# Patient Record
Sex: MALE | Race: WHITE | Employment: UNEMPLOYED | URBAN - METROPOLITAN AREA
[De-identification: names, ages, dates, MRNs, and addresses within clinical notes are randomized per-mention and may not be internally consistent; named-entity substitution may affect disease eponyms.]

---

## 2022-03-31 ENCOUNTER — TELEPHONE (OUTPATIENT)
Dept: ADMINISTRATIVE | Age: 62
End: 2022-03-31

## 2022-04-01 ENCOUNTER — TELEPHONE (OUTPATIENT)
Dept: RHEUMATOLOGY | Age: 62
End: 2022-04-01

## 2022-04-01 NOTE — TELEPHONE ENCOUNTER
Pt called in wanting to know if Dr. Katerin He offers knee injections? Please, advise. Josias Dobson can be reached at [phone]. Thank you.

## 2022-07-18 ENCOUNTER — OFFICE VISIT (OUTPATIENT)
Dept: RHEUMATOLOGY | Age: 62
End: 2022-07-18
Payer: COMMERCIAL

## 2022-07-18 VITALS
HEIGHT: 68 IN | WEIGHT: 176 LBS | DIASTOLIC BLOOD PRESSURE: 78 MMHG | SYSTOLIC BLOOD PRESSURE: 126 MMHG | BODY MASS INDEX: 26.67 KG/M2 | HEART RATE: 66 BPM | RESPIRATION RATE: 16 BRPM | OXYGEN SATURATION: 97 %

## 2022-07-18 DIAGNOSIS — M15.9 GENERALIZED OSTEOARTHRITIS: ICD-10-CM

## 2022-07-18 DIAGNOSIS — R76.8 POSITIVE ANA (ANTINUCLEAR ANTIBODY): Primary | ICD-10-CM

## 2022-07-18 DIAGNOSIS — R76.8 POSITIVE ANA (ANTINUCLEAR ANTIBODY): ICD-10-CM

## 2022-07-18 LAB
ALBUMIN SERPL-MCNC: 4.7 G/DL (ref 3.5–5.2)
ALP BLD-CCNC: 73 U/L (ref 40–129)
ALT SERPL-CCNC: 12 U/L (ref 0–40)
ANION GAP SERPL CALCULATED.3IONS-SCNC: 11 MMOL/L (ref 7–16)
AST SERPL-CCNC: 16 U/L (ref 0–39)
BASOPHILS ABSOLUTE: 0.02 E9/L (ref 0–0.2)
BASOPHILS RELATIVE PERCENT: 0.6 % (ref 0–2)
BILIRUB SERPL-MCNC: 1.3 MG/DL (ref 0–1.2)
BILIRUBIN URINE: NEGATIVE
BLOOD, URINE: NEGATIVE
BUN BLDV-MCNC: 18 MG/DL (ref 6–20)
C3 COMPLEMENT: 119 MG/DL (ref 90–180)
C4 COMPLEMENT: 30 MG/DL (ref 10–40)
CALCIUM SERPL-MCNC: 9.5 MG/DL (ref 8.6–10.2)
CHLORIDE BLD-SCNC: 102 MMOL/L (ref 98–107)
CLARITY: CLEAR
CO2: 28 MMOL/L (ref 22–29)
COLOR: YELLOW
CREAT SERPL-MCNC: 1.2 MG/DL (ref 0.7–1.2)
EOSINOPHILS ABSOLUTE: 0.05 E9/L (ref 0.05–0.5)
EOSINOPHILS RELATIVE PERCENT: 1.5 % (ref 0–6)
GFR AFRICAN AMERICAN: >60
GFR NON-AFRICAN AMERICAN: >60 ML/MIN/1.73
GLUCOSE BLD-MCNC: 86 MG/DL (ref 74–99)
GLUCOSE URINE: NEGATIVE MG/DL
HCT VFR BLD CALC: 43.2 % (ref 37–54)
HEMOGLOBIN: 13.5 G/DL (ref 12.5–16.5)
IMMATURE GRANULOCYTES #: 0.01 E9/L
IMMATURE GRANULOCYTES %: 0.3 % (ref 0–5)
KETONES, URINE: ABNORMAL MG/DL
LEUKOCYTE ESTERASE, URINE: NEGATIVE
LYMPHOCYTES ABSOLUTE: 1.02 E9/L (ref 1.5–4)
LYMPHOCYTES RELATIVE PERCENT: 30.1 % (ref 20–42)
MCH RBC QN AUTO: 29.9 PG (ref 26–35)
MCHC RBC AUTO-ENTMCNC: 31.3 % (ref 32–34.5)
MCV RBC AUTO: 95.6 FL (ref 80–99.9)
MONOCYTES ABSOLUTE: 0.36 E9/L (ref 0.1–0.95)
MONOCYTES RELATIVE PERCENT: 10.6 % (ref 2–12)
NEUTROPHILS ABSOLUTE: 1.93 E9/L (ref 1.8–7.3)
NEUTROPHILS RELATIVE PERCENT: 56.9 % (ref 43–80)
NITRITE, URINE: NEGATIVE
PDW BLD-RTO: 14.3 FL (ref 11.5–15)
PH UA: 6.5 (ref 5–9)
PLATELET # BLD: 317 E9/L (ref 130–450)
PMV BLD AUTO: 10.9 FL (ref 7–12)
POTASSIUM SERPL-SCNC: 4.3 MMOL/L (ref 3.5–5)
PROTEIN UA: NEGATIVE MG/DL
RBC # BLD: 4.52 E12/L (ref 3.8–5.8)
SODIUM BLD-SCNC: 141 MMOL/L (ref 132–146)
SPECIFIC GRAVITY UA: 1.02 (ref 1–1.03)
TOTAL PROTEIN: 7.2 G/DL (ref 6.4–8.3)
UROBILINOGEN, URINE: 0.2 E.U./DL
WBC # BLD: 3.4 E9/L (ref 4.5–11.5)

## 2022-07-18 PROCEDURE — 99204 OFFICE O/P NEW MOD 45 MIN: CPT | Performed by: INTERNAL MEDICINE

## 2022-07-18 RX ORDER — TRAMADOL HYDROCHLORIDE 50 MG/1
50 TABLET ORAL EVERY 6 HOURS PRN
COMMUNITY

## 2022-07-18 ASSESSMENT — ENCOUNTER SYMPTOMS
TROUBLE SWALLOWING: 0
ABDOMINAL PAIN: 0
VOMITING: 0
DIARRHEA: 0
COUGH: 0
NAUSEA: 0
SHORTNESS OF BREATH: 0
COLOR CHANGE: 0

## 2022-07-18 NOTE — PROGRESS NOTES
Raynaud's, dysuria, hematuria, history of DVT. Past Medical History:   Diagnosis Date    Fracture of right wrist 1973    GERD (gastroesophageal reflux disease)     Heel spur     Plantar fasciitis     Sinus problem     cysts and polyps        Review of Systems   Constitutional:  Negative for fatigue and fever. HENT:  Negative for mouth sores and trouble swallowing. Respiratory:  Negative for cough and shortness of breath. Cardiovascular:  Negative for chest pain. Gastrointestinal:  Negative for abdominal pain, diarrhea, nausea and vomiting. Genitourinary:  Negative for dysuria and hematuria. Musculoskeletal:  Positive for arthralgias. Negative for joint swelling. Skin:  Negative for color change and rash. Neurological:  Negative for weakness and numbness. Hematological:  Negative for adenopathy. All other systems reviewed and are negative. Objective   Vitals:    07/18/22 1428   BP: 126/78   Pulse: 66   Resp: 16   SpO2: 97%      Physical Exam  Constitutional:       General: He is not in acute distress. Appearance: Normal appearance. HENT:      Head: Normocephalic and atraumatic. Right Ear: External ear normal.      Left Ear: External ear normal.      Nose: Nose normal.   Eyes:      General: No scleral icterus. Neck:      Comments: He has 2 lipomas versus inclusion cyst of the right side of the neck. They are too superficial to be lymph nodes. Pulmonary:      Effort: Pulmonary effort is normal.   Musculoskeletal:         General: No swelling, tenderness or deformity. Skin:     General: Skin is warm and dry. Findings: No rash. Neurological:      General: No focal deficit present. Mental Status: He is alert and oriented to person, place, and time. Mental status is at baseline.    Psychiatric:         Mood and Affect: Mood normal.         Behavior: Behavior normal.          No results found for: WBC, HGB, HCT, MCV, PLT  No results found for: NA, K, CL, CO2, BUN, CREATININE, GLUCOSE, CALCIUM, PROT, LABALBU, BILITOT, ALKPHOS, AST, ALT, LABGLOM, GFRAA, AGRATIO, GLOB  No results found for: SUSIE  No results found for: RHEUMFACTOR  No results found for: SEDRATE  No results found for: CRP         An electronic signature was used to authenticate this note. This note was generated with a voice recognition dictation system. Please disregard any errors or omission which have escaped my review.     --Santana Morse, DO

## 2022-07-19 LAB
ANTI DNA DOUBLE STRANDED: NEGATIVE
ENA TO RNP ANTIBODY: NEGATIVE
ENA TO SMITH (SM) ANTIBODY: NEGATIVE
ENA TO SSA (RO) ANTIBODY: NEGATIVE
ENA TO SSB (LA) ANTIBODY: NEGATIVE

## 2024-04-27 ENCOUNTER — HOSPITAL ENCOUNTER (EMERGENCY)
Age: 64
Discharge: HOME | End: 2024-04-27
Attending: STUDENT IN AN ORGANIZED HEALTH CARE EDUCATION/TRAINING PROGRAM
Payer: COMMERCIAL

## 2024-04-27 ENCOUNTER — APPOINTMENT (OUTPATIENT)
Dept: CT IMAGING | Age: 64
End: 2024-04-27
Payer: COMMERCIAL

## 2024-04-27 VITALS
RESPIRATION RATE: 18 BRPM | TEMPERATURE: 98.1 F | SYSTOLIC BLOOD PRESSURE: 146 MMHG | DIASTOLIC BLOOD PRESSURE: 81 MMHG | HEART RATE: 87 BPM | OXYGEN SATURATION: 98 % | BODY MASS INDEX: 25.46 KG/M2 | HEIGHT: 68 IN | WEIGHT: 168 LBS

## 2024-04-27 DIAGNOSIS — K76.9 HEPATIC LESION: ICD-10-CM

## 2024-04-27 DIAGNOSIS — R10.9 ABDOMINAL PAIN, UNSPECIFIED ABDOMINAL LOCATION: Primary | ICD-10-CM

## 2024-04-27 DIAGNOSIS — E87.6 HYPOKALEMIA: ICD-10-CM

## 2024-04-27 DIAGNOSIS — N28.9 KIDNEY LESION: ICD-10-CM

## 2024-04-27 LAB
ALBUMIN SERPL-MCNC: 4.8 G/DL (ref 3.5–5.2)
ALP SERPL-CCNC: 61 U/L (ref 40–129)
ALT SERPL-CCNC: 32 U/L (ref 0–40)
ANION GAP SERPL CALCULATED.3IONS-SCNC: 13 MMOL/L (ref 7–16)
AST SERPL-CCNC: 58 U/L (ref 0–39)
BASOPHILS # BLD: 0.02 K/UL (ref 0–0.2)
BASOPHILS NFR BLD: 1 % (ref 0–2)
BILIRUB SERPL-MCNC: 1.1 MG/DL (ref 0–1.2)
BILIRUB UR QL STRIP: NEGATIVE
BUN SERPL-MCNC: 19 MG/DL (ref 6–23)
CALCIUM SERPL-MCNC: 9.1 MG/DL (ref 8.6–10.2)
CHLORIDE SERPL-SCNC: 101 MMOL/L (ref 98–107)
CLARITY UR: CLEAR
CO2 SERPL-SCNC: 27 MMOL/L (ref 22–29)
COLOR UR: YELLOW
CREAT SERPL-MCNC: 1.6 MG/DL (ref 0.7–1.2)
EOSINOPHIL # BLD: 0.07 K/UL (ref 0.05–0.5)
EOSINOPHILS RELATIVE PERCENT: 2 % (ref 0–6)
ERYTHROCYTE [DISTWIDTH] IN BLOOD BY AUTOMATED COUNT: 13.4 % (ref 11.5–15)
GFR SERPL CREATININE-BSD FRML MDRD: 50 ML/MIN/1.73M2
GLUCOSE SERPL-MCNC: 133 MG/DL (ref 74–99)
GLUCOSE UR STRIP-MCNC: NEGATIVE MG/DL
HCT VFR BLD AUTO: 44.3 % (ref 37–54)
HGB BLD-MCNC: 14.4 G/DL (ref 12.5–16.5)
HGB UR QL STRIP.AUTO: NEGATIVE
IMM GRANULOCYTES # BLD AUTO: <0.03 K/UL (ref 0–0.58)
IMM GRANULOCYTES NFR BLD: 0 % (ref 0–5)
KETONES UR STRIP-MCNC: 15 MG/DL
LACTATE BLDV-SCNC: 1.6 MMOL/L (ref 0.5–2.2)
LEUKOCYTE ESTERASE UR QL STRIP: NEGATIVE
LIPASE SERPL-CCNC: 19 U/L (ref 13–60)
LYMPHOCYTES NFR BLD: 1.33 K/UL (ref 1.5–4)
LYMPHOCYTES RELATIVE PERCENT: 31 % (ref 20–42)
MAGNESIUM SERPL-MCNC: 2 MG/DL (ref 1.6–2.6)
MCH RBC QN AUTO: 30 PG (ref 26–35)
MCHC RBC AUTO-ENTMCNC: 32.5 G/DL (ref 32–34.5)
MCV RBC AUTO: 92.3 FL (ref 80–99.9)
MONOCYTES NFR BLD: 0.35 K/UL (ref 0.1–0.95)
MONOCYTES NFR BLD: 8 % (ref 2–12)
NEUTROPHILS NFR BLD: 59 % (ref 43–80)
NEUTS SEG NFR BLD: 2.52 K/UL (ref 1.8–7.3)
NITRITE UR QL STRIP: NEGATIVE
PH UR STRIP: 6 [PH] (ref 5–9)
PLATELET # BLD AUTO: 349 K/UL (ref 130–450)
PMV BLD AUTO: 10.3 FL (ref 7–12)
POTASSIUM SERPL-SCNC: 3.4 MMOL/L (ref 3.5–5)
PROT SERPL-MCNC: 7.4 G/DL (ref 6.4–8.3)
PROT UR STRIP-MCNC: NEGATIVE MG/DL
RBC # BLD AUTO: 4.8 M/UL (ref 3.8–5.8)
RBC #/AREA URNS HPF: ABNORMAL /HPF
SODIUM SERPL-SCNC: 141 MMOL/L (ref 132–146)
SP GR UR STRIP: 1.01 (ref 1–1.03)
UROBILINOGEN UR STRIP-ACNC: 1 EU/DL (ref 0–1)
WBC #/AREA URNS HPF: ABNORMAL /HPF
WBC OTHER # BLD: 4.3 K/UL (ref 4.5–11.5)

## 2024-04-27 PROCEDURE — 6360000004 HC RX CONTRAST MEDICATION: Performed by: RADIOLOGY

## 2024-04-27 PROCEDURE — 6370000000 HC RX 637 (ALT 250 FOR IP): Performed by: STUDENT IN AN ORGANIZED HEALTH CARE EDUCATION/TRAINING PROGRAM

## 2024-04-27 PROCEDURE — 81001 URINALYSIS AUTO W/SCOPE: CPT

## 2024-04-27 PROCEDURE — 96372 THER/PROPH/DIAG INJ SC/IM: CPT

## 2024-04-27 PROCEDURE — 6360000002 HC RX W HCPCS: Performed by: STUDENT IN AN ORGANIZED HEALTH CARE EDUCATION/TRAINING PROGRAM

## 2024-04-27 PROCEDURE — 96374 THER/PROPH/DIAG INJ IV PUSH: CPT

## 2024-04-27 PROCEDURE — 99285 EMERGENCY DEPT VISIT HI MDM: CPT

## 2024-04-27 PROCEDURE — 80053 COMPREHEN METABOLIC PANEL: CPT

## 2024-04-27 PROCEDURE — 2580000003 HC RX 258: Performed by: STUDENT IN AN ORGANIZED HEALTH CARE EDUCATION/TRAINING PROGRAM

## 2024-04-27 PROCEDURE — 85025 COMPLETE CBC W/AUTO DIFF WBC: CPT

## 2024-04-27 PROCEDURE — 83690 ASSAY OF LIPASE: CPT

## 2024-04-27 PROCEDURE — 83735 ASSAY OF MAGNESIUM: CPT

## 2024-04-27 PROCEDURE — 83605 ASSAY OF LACTIC ACID: CPT

## 2024-04-27 PROCEDURE — 74177 CT ABD & PELVIS W/CONTRAST: CPT

## 2024-04-27 RX ORDER — POTASSIUM CHLORIDE 20 MEQ/1
40 TABLET, EXTENDED RELEASE ORAL ONCE
Status: COMPLETED | OUTPATIENT
Start: 2024-04-27 | End: 2024-04-27

## 2024-04-27 RX ORDER — MORPHINE SULFATE 4 MG/ML
4 INJECTION, SOLUTION INTRAMUSCULAR; INTRAVENOUS ONCE
Status: COMPLETED | OUTPATIENT
Start: 2024-04-27 | End: 2024-04-27

## 2024-04-27 RX ORDER — DOCUSATE SODIUM 100 MG/1
100 CAPSULE, LIQUID FILLED ORAL 2 TIMES DAILY
Qty: 60 CAPSULE | Refills: 0 | Status: SHIPPED | OUTPATIENT
Start: 2024-04-27 | End: 2024-05-27

## 2024-04-27 RX ORDER — 0.9 % SODIUM CHLORIDE 0.9 %
1000 INTRAVENOUS SOLUTION INTRAVENOUS ONCE
Status: COMPLETED | OUTPATIENT
Start: 2024-04-27 | End: 2024-04-27

## 2024-04-27 RX ORDER — DICYCLOMINE HYDROCHLORIDE 10 MG/ML
20 INJECTION INTRAMUSCULAR ONCE
Status: COMPLETED | OUTPATIENT
Start: 2024-04-27 | End: 2024-04-27

## 2024-04-27 RX ADMIN — IOPAMIDOL 75 ML: 755 INJECTION, SOLUTION INTRAVENOUS at 02:36

## 2024-04-27 RX ADMIN — MORPHINE SULFATE 4 MG: 4 INJECTION, SOLUTION INTRAMUSCULAR; INTRAVENOUS at 01:16

## 2024-04-27 RX ADMIN — SODIUM CHLORIDE 1000 ML: 9 INJECTION, SOLUTION INTRAVENOUS at 01:17

## 2024-04-27 RX ADMIN — DICYCLOMINE HYDROCHLORIDE 20 MG: 10 INJECTION, SOLUTION INTRAMUSCULAR at 01:16

## 2024-04-27 RX ADMIN — POTASSIUM CHLORIDE 40 MEQ: 1500 TABLET, EXTENDED RELEASE ORAL at 03:59

## 2024-04-27 ASSESSMENT — ENCOUNTER SYMPTOMS
NAUSEA: 0
COUGH: 0
BACK PAIN: 0
ABDOMINAL PAIN: 1
PHOTOPHOBIA: 0
SHORTNESS OF BREATH: 0
SORE THROAT: 0
DIARRHEA: 0

## 2024-04-27 ASSESSMENT — PAIN - FUNCTIONAL ASSESSMENT: PAIN_FUNCTIONAL_ASSESSMENT: 0-10

## 2024-04-27 ASSESSMENT — PAIN DESCRIPTION - LOCATION: LOCATION: ABDOMEN

## 2024-04-27 ASSESSMENT — PAIN SCALES - GENERAL: PAINLEVEL_OUTOF10: 10

## 2024-04-27 ASSESSMENT — PAIN DESCRIPTION - DESCRIPTORS: DESCRIPTORS: CRAMPING

## 2024-04-27 NOTE — ED PROVIDER NOTES
[facility] EMERGENCY DEPARTMENT  EMERGENCY DEPARTMENT ENCOUNTER        Pt Name: Jose Ocasio  MRN: [id number]  Birthdate 1960  Date of evaluation: 4/27/2024  Provider: Kiley Alvarez MD  PCP: Maximiliano Lainez MD  Note Started: 2:52 AM EDT 4/27/24    HPI  63 y.o. male presenting for abdominal pain.  Patient complains of diffuse abdominal cramping since 11 PM tonight.  He denies any nausea, emesis.  He states he is never had this before.  He denies any previous abdominal surgical history.  He states his last bowel movement was earlier in the day.  Denies any fevers, chest pain, shortness of breath.      --------------------------------------------- PAST HISTORY ---------------------------------------------  Past Medical History:  has a past medical history of Fracture of right wrist, GERD (gastroesophageal reflux disease), Heel spur, Plantar fasciitis, and Sinus problem.    Past Surgical History:  has a past surgical history that includes other surgical history (1988); sinus surgery; Endoscopy, colon, diagnostic; Colonoscopy; and nasal/sinus endoscopy (12/08/2014).    Social History:  reports that he has never smoked. He has never used smokeless tobacco. He reports that he does not drink alcohol and does not use drugs.    Family History: family history is not on file.     The patient’s home medications have been reviewed.    Allergies: Milk-related compounds, Molds & smuts, Cat hair extract, and Dust mite extract      Review of Systems   Constitutional:  Negative for chills and fever.   HENT:  Negative for congestion and sore throat.    Eyes:  Negative for photophobia and visual disturbance.   Respiratory:  Negative for cough and shortness of breath.    Cardiovascular:  Negative for chest pain and leg swelling.   Gastrointestinal:  Positive for abdominal pain. Negative for diarrhea and nausea.   Genitourinary:  Negative for dysuria and flank pain.

## 2024-05-03 ENCOUNTER — HOSPITAL ENCOUNTER (EMERGENCY)
Age: 64
Discharge: HOME | End: 2024-05-03
Attending: STUDENT IN AN ORGANIZED HEALTH CARE EDUCATION/TRAINING PROGRAM
Payer: COMMERCIAL

## 2024-05-03 VITALS
HEART RATE: 86 BPM | TEMPERATURE: 98.4 F | WEIGHT: 168 LBS | DIASTOLIC BLOOD PRESSURE: 95 MMHG | OXYGEN SATURATION: 100 % | RESPIRATION RATE: 16 BRPM | HEIGHT: 68 IN | SYSTOLIC BLOOD PRESSURE: 141 MMHG | BODY MASS INDEX: 25.46 KG/M2

## 2024-05-03 DIAGNOSIS — H53.9 VISUAL DISTURBANCE OF ONE EYE: Primary | ICD-10-CM

## 2024-05-03 PROCEDURE — 99282 EMERGENCY DEPT VISIT SF MDM: CPT

## 2024-05-03 ASSESSMENT — VISUAL ACUITY
OS: 20/13
OD: 20/15
OU: 20/13

## 2024-05-03 ASSESSMENT — PAIN SCALES - GENERAL: PAINLEVEL_OUTOF10: 2

## 2024-05-03 ASSESSMENT — PAIN - FUNCTIONAL ASSESSMENT: PAIN_FUNCTIONAL_ASSESSMENT: 0-10

## 2024-05-03 NOTE — ED PROVIDER NOTES
[facility] EMERGENCY DEPARTMENT  EMERGENCY DEPARTMENT ENCOUNTER        Pt Name: Jose Ocasio  MRN: [id number]  Birthdate 1960  Date of evaluation: 5/3/2024  Provider: Kiley Alvarez MD  PCP: Maximiliano Lainez MD  Note Started: 5:27 AM EDT 5/3/24    HPI  63 y.o. male presenting for eye pain.  Patient complains of seeing flashes in the side of his right eye and starts, and floaters since yesterday.  He complains of eye irritation.  He complains of visual disturbance when the floaters which is out of his eye.  Symptoms are worsened with eye movement.  He denies any headache, numbness, tingling, or other complaints at this time.      --------------------------------------------- PAST HISTORY ---------------------------------------------  Past Medical History:  has a past medical history of Fracture of right wrist, GERD (gastroesophageal reflux disease), Heel spur, Plantar fasciitis, and Sinus problem.    Past Surgical History:  has a past surgical history that includes other surgical history (1988); sinus surgery; Endoscopy, colon, diagnostic; Colonoscopy; and nasal/sinus endoscopy (12/08/2014).    Social History:  reports that he has never smoked. He has never used smokeless tobacco. He reports that he does not drink alcohol and does not use drugs.    Family History: family history is not on file.     The patient’s home medications have been reviewed.    Allergies: Milk-related compounds, Molds & smuts, Cat hair extract, and Dust mite extract      Review of Systems   Eyes:  Positive for visual disturbance.   All other systems reviewed and are negative.       Physical Exam  Constitutional:       General: He is not in acute distress.     Appearance: Normal appearance. He is not ill-appearing.   HENT:      Head: Normocephalic and atraumatic.      Right Ear: External ear normal.      Left Ear: External ear normal.      Nose: Nose normal.   Eyes:      Extraocular

## 2024-09-26 ENCOUNTER — HOSPITAL ENCOUNTER (INPATIENT)
Age: 64
LOS: 17 days | Discharge: PSYCHIATRIC OR SUBSTANCE USE FACILITY | DRG: 885 | End: 2024-10-14
Attending: EMERGENCY MEDICINE | Admitting: PSYCHIATRY & NEUROLOGY
Payer: COMMERCIAL

## 2024-09-26 DIAGNOSIS — F29 PSYCHOSIS, UNSPECIFIED PSYCHOSIS TYPE (HCC): ICD-10-CM

## 2024-09-26 DIAGNOSIS — Z00.8 PATIENT NEEDS PSYCHIATRIC HOLD FOR EVALUATION: Primary | ICD-10-CM

## 2024-09-26 LAB
ALBUMIN SERPL-MCNC: 4.3 G/DL (ref 3.5–5.2)
ALP SERPL-CCNC: 74 U/L (ref 40–129)
ALT SERPL-CCNC: 9 U/L (ref 0–40)
AMPHET UR QL SCN: NEGATIVE
ANION GAP SERPL CALCULATED.3IONS-SCNC: 11 MMOL/L (ref 7–16)
APAP SERPL-MCNC: <5 UG/ML (ref 10–30)
AST SERPL-CCNC: 22 U/L (ref 0–39)
BARBITURATES UR QL SCN: NEGATIVE
BASOPHILS # BLD: 0.03 K/UL (ref 0–0.2)
BASOPHILS NFR BLD: 1 % (ref 0–2)
BENZODIAZ UR QL: NEGATIVE
BILIRUB SERPL-MCNC: 1 MG/DL (ref 0–1.2)
BUN SERPL-MCNC: 7 MG/DL (ref 6–23)
BUPRENORPHINE UR QL: NEGATIVE
CALCIUM SERPL-MCNC: 9.4 MG/DL (ref 8.6–10.2)
CANNABINOIDS UR QL SCN: NEGATIVE
CHLORIDE SERPL-SCNC: 104 MMOL/L (ref 98–107)
CO2 SERPL-SCNC: 28 MMOL/L (ref 22–29)
COCAINE UR QL SCN: NEGATIVE
CREAT SERPL-MCNC: 1.1 MG/DL (ref 0.7–1.2)
EOSINOPHIL # BLD: 0.11 K/UL (ref 0.05–0.5)
EOSINOPHILS RELATIVE PERCENT: 2 % (ref 0–6)
ERYTHROCYTE [DISTWIDTH] IN BLOOD BY AUTOMATED COUNT: 14.8 % (ref 11.5–15)
ETHANOLAMINE SERPL-MCNC: <10 MG/DL (ref 0–0.08)
FENTANYL UR QL: NEGATIVE
GFR, ESTIMATED: 76 ML/MIN/1.73M2
GLUCOSE SERPL-MCNC: 79 MG/DL (ref 74–99)
HCT VFR BLD AUTO: 43.1 % (ref 37–54)
HGB BLD-MCNC: 13.9 G/DL (ref 12.5–16.5)
IMM GRANULOCYTES # BLD AUTO: <0.03 K/UL (ref 0–0.58)
IMM GRANULOCYTES NFR BLD: 0 % (ref 0–5)
LYMPHOCYTES NFR BLD: 0.9 K/UL (ref 1.5–4)
LYMPHOCYTES RELATIVE PERCENT: 19 % (ref 20–42)
MCH RBC QN AUTO: 29.6 PG (ref 26–35)
MCHC RBC AUTO-ENTMCNC: 32.3 G/DL (ref 32–34.5)
MCV RBC AUTO: 91.7 FL (ref 80–99.9)
METHADONE UR QL: NEGATIVE
MONOCYTES NFR BLD: 0.54 K/UL (ref 0.1–0.95)
MONOCYTES NFR BLD: 11 % (ref 2–12)
NEUTROPHILS NFR BLD: 67 % (ref 43–80)
NEUTS SEG NFR BLD: 3.19 K/UL (ref 1.8–7.3)
OPIATES UR QL SCN: NEGATIVE
OXYCODONE UR QL SCN: NEGATIVE
PCP UR QL SCN: NEGATIVE
PLATELET # BLD AUTO: 348 K/UL (ref 130–450)
PMV BLD AUTO: 9.6 FL (ref 7–12)
POTASSIUM SERPL-SCNC: 3.3 MMOL/L (ref 3.5–5)
PROT SERPL-MCNC: 7 G/DL (ref 6.4–8.3)
RBC # BLD AUTO: 4.7 M/UL (ref 3.8–5.8)
SALICYLATES SERPL-MCNC: <0.3 MG/DL (ref 0–30)
SODIUM SERPL-SCNC: 143 MMOL/L (ref 132–146)
TEST INFORMATION: NORMAL
TOXIC TRICYCLIC SC,BLOOD: NEGATIVE
WBC OTHER # BLD: 4.8 K/UL (ref 4.5–11.5)

## 2024-09-26 PROCEDURE — G0480 DRUG TEST DEF 1-7 CLASSES: HCPCS

## 2024-09-26 PROCEDURE — 80179 DRUG ASSAY SALICYLATE: CPT

## 2024-09-26 PROCEDURE — 80053 COMPREHEN METABOLIC PANEL: CPT

## 2024-09-26 PROCEDURE — 80307 DRUG TEST PRSMV CHEM ANLYZR: CPT

## 2024-09-26 PROCEDURE — 93005 ELECTROCARDIOGRAM TRACING: CPT | Performed by: EMERGENCY MEDICINE

## 2024-09-26 PROCEDURE — 80143 DRUG ASSAY ACETAMINOPHEN: CPT

## 2024-09-26 PROCEDURE — 99285 EMERGENCY DEPT VISIT HI MDM: CPT

## 2024-09-26 PROCEDURE — 85025 COMPLETE CBC W/AUTO DIFF WBC: CPT

## 2024-09-26 ASSESSMENT — LIFESTYLE VARIABLES: HOW OFTEN DO YOU HAVE A DRINK CONTAINING ALCOHOL: NEVER

## 2024-09-27 PROBLEM — F23 ACUTE PSYCHOSIS (HCC): Status: ACTIVE | Noted: 2024-09-27

## 2024-09-27 LAB
EKG ATRIAL RATE: 89 BPM
EKG P AXIS: 66 DEGREES
EKG P-R INTERVAL: 158 MS
EKG Q-T INTERVAL: 354 MS
EKG QRS DURATION: 90 MS
EKG QTC CALCULATION (BAZETT): 430 MS
EKG R AXIS: 67 DEGREES
EKG T AXIS: 68 DEGREES
EKG VENTRICULAR RATE: 89 BPM

## 2024-09-27 PROCEDURE — 93010 ELECTROCARDIOGRAM REPORT: CPT | Performed by: INTERNAL MEDICINE

## 2024-09-27 PROCEDURE — 90792 PSYCH DIAG EVAL W/MED SRVCS: CPT

## 2024-09-27 PROCEDURE — 1240000000 HC EMOTIONAL WELLNESS R&B

## 2024-09-27 RX ORDER — HALOPERIDOL 5 MG/ML
3 INJECTION INTRAMUSCULAR EVERY 6 HOURS PRN
Status: DISCONTINUED | OUTPATIENT
Start: 2024-09-27 | End: 2024-10-14 | Stop reason: HOSPADM

## 2024-09-27 RX ORDER — ACETAMINOPHEN 325 MG/1
650 TABLET ORAL EVERY 4 HOURS PRN
Status: DISCONTINUED | OUTPATIENT
Start: 2024-09-27 | End: 2024-10-14 | Stop reason: HOSPADM

## 2024-09-27 RX ORDER — LANOLIN ALCOHOL/MO/W.PET/CERES
3 CREAM (GRAM) TOPICAL NIGHTLY PRN
Status: DISCONTINUED | OUTPATIENT
Start: 2024-09-27 | End: 2024-10-14 | Stop reason: HOSPADM

## 2024-09-27 RX ORDER — ARMODAFINIL 150 MG/1
150 TABLET ORAL DAILY
Status: ON HOLD | COMMUNITY
End: 2024-10-14 | Stop reason: HOSPADM

## 2024-09-27 RX ORDER — RISPERIDONE 1 MG/1
1 TABLET ORAL NIGHTLY
Status: DISCONTINUED | OUTPATIENT
Start: 2024-09-27 | End: 2024-10-09

## 2024-09-27 RX ORDER — MAGNESIUM HYDROXIDE/ALUMINUM HYDROXICE/SIMETHICONE 120; 1200; 1200 MG/30ML; MG/30ML; MG/30ML
30 SUSPENSION ORAL PRN
Status: DISCONTINUED | OUTPATIENT
Start: 2024-09-27 | End: 2024-10-14 | Stop reason: HOSPADM

## 2024-09-27 RX ORDER — HALOPERIDOL 2 MG/1
3 TABLET ORAL EVERY 6 HOURS PRN
Status: DISCONTINUED | OUTPATIENT
Start: 2024-09-27 | End: 2024-10-14 | Stop reason: HOSPADM

## 2024-09-27 RX ORDER — HYDROXYZINE PAMOATE 25 MG/1
25 CAPSULE ORAL 3 TIMES DAILY PRN
Status: DISCONTINUED | OUTPATIENT
Start: 2024-09-27 | End: 2024-10-14 | Stop reason: HOSPADM

## 2024-09-27 RX ORDER — DIVALPROEX SODIUM 250 MG/1
250 TABLET, DELAYED RELEASE ORAL EVERY 8 HOURS SCHEDULED
Status: DISCONTINUED | OUTPATIENT
Start: 2024-09-27 | End: 2024-10-09

## 2024-09-27 RX ORDER — NICOTINE 21 MG/24HR
1 PATCH, TRANSDERMAL 24 HOURS TRANSDERMAL DAILY
Status: DISCONTINUED | OUTPATIENT
Start: 2024-09-27 | End: 2024-09-27

## 2024-09-27 ASSESSMENT — PATIENT HEALTH QUESTIONNAIRE - PHQ9
2. FEELING DOWN, DEPRESSED OR HOPELESS: SEVERAL DAYS
SUM OF ALL RESPONSES TO PHQ QUESTIONS 1-9: 1
SUM OF ALL RESPONSES TO PHQ9 QUESTIONS 1 & 2: 1
SUM OF ALL RESPONSES TO PHQ QUESTIONS 1-9: 1
1. LITTLE INTEREST OR PLEASURE IN DOING THINGS: NOT AT ALL
1. LITTLE INTEREST OR PLEASURE IN DOING THINGS: NOT AT ALL
SUM OF ALL RESPONSES TO PHQ QUESTIONS 1-9: 1
SUM OF ALL RESPONSES TO PHQ9 QUESTIONS 1 & 2: 1
2. FEELING DOWN, DEPRESSED OR HOPELESS: SEVERAL DAYS

## 2024-09-27 ASSESSMENT — LIFESTYLE VARIABLES
HOW MANY STANDARD DRINKS CONTAINING ALCOHOL DO YOU HAVE ON A TYPICAL DAY: PATIENT DOES NOT DRINK
HOW OFTEN DO YOU HAVE A DRINK CONTAINING ALCOHOL: NEVER
HOW MANY STANDARD DRINKS CONTAINING ALCOHOL DO YOU HAVE ON A TYPICAL DAY: PATIENT DOES NOT DRINK
HOW OFTEN DO YOU HAVE A DRINK CONTAINING ALCOHOL: NEVER

## 2024-09-27 ASSESSMENT — SLEEP AND FATIGUE QUESTIONNAIRES
AVERAGE NUMBER OF SLEEP HOURS: 6
DO YOU HAVE DIFFICULTY SLEEPING: NO
AVERAGE NUMBER OF SLEEP HOURS: 6
DO YOU USE A SLEEP AID: NO
DO YOU USE A SLEEP AID: NO
DO YOU HAVE DIFFICULTY SLEEPING: NO

## 2024-09-27 ASSESSMENT — PAIN SCALES - GENERAL: PAINLEVEL_OUTOF10: 0

## 2024-09-27 NOTE — CARE COORDINATION
CTRS met with patient to complete leisure assessment.       09/27/24 1200   Activities of Daily Living   Patient Requires assistance with daily self-care activities? No   Leisure Activity 1   3 Favorite Leisure Activities \"tennis, music, eating good food''   Frequency weekly   Last time this week   Barriers to participating  motivation;social (ie. no one to do it with);financial/money   Social   Patient reports spending the majority of their free time with one other person   Patient verbalizes a preference for spending free time with one other person   Patient’s perception of support system none  (patient does not believe he has support\)   Patient’s perception of barriers to socializing with others include(s) no perceived barriers   Social Details patient currently lives alone in an apartment.  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  Patient reports he has no support at this time   Beliefs & Coping   Has difficulty dealing with feelings   Yes   Internalizes feelings/Keeps feelings in No   Externalizes feelings through aggressiveness or poor temper control  No   Feels uncomfortable around others  No   Has difficulty talking to others  No   Depends on others for direction or decisions No   Difficulty dealing with anger of others  No   Difficulty dealing with own anger  No   Difficulty managing stress Yes  (\"the woman, my neighbor, is hacking me\")   Frequently has difficulty with relationships  Yes   which,who,where with friends/peers  (\"the woman who lives downstairs keeps hacking me\")   Has recently perceived/experienced loss, disappointment, humiliation or failure  NO   General perception about self likes self   Attitude about abilities more successful than not   Locus of Control  most of the time   Belief about recovery Recovery is possible   Patient Identified Strengths  \"Im articulate\"   Patient Identified Limitations  \"Nothing\"   Perception of most stressful event prior to hospitalization \"The police.  I think

## 2024-09-27 NOTE — BH NOTE
Patient transferred to unit at this time and tolerated well.  Patient with pressured speech, tangentiality, unstable and exaggerated affect, patient with noted paranoia and grandiosity.  With much 1:1 patient signed his admission paperwork, was able to review patient's medication list and verified medications at [facility] pharmacy in [place].  Patient requires CPAP due to KEENAN with last assessment being provided by Dr. Wild of [facility] on 9/13/24, settings per physicians notes as follows   Settings: 10.5-79oyA9P [sic]  90/95%ile pressure: 12.6cmH2O   Iman NP made aware of patient's CPAP need, patient's request for cepacol, and patient's medication rec completion including that patient was prescribed Armodafinil 150mg 7/8/24.  Patient was oriented to unit and verbalized understanding.  Electronic watch was removed from patient's possession.  Denies SI/HI and A/V/H upon arrival to unit.

## 2024-09-27 NOTE — CARE COORDINATION
SW attempted to meet with pt to complete assessment. Pt is currently on the phone. SW will meet with pt at a later time to complete assessment.

## 2024-09-27 NOTE — BH NOTE
4 Eyes Skin Assessment     NAME:  Jose Ocasio  YOB: 1960  MEDICAL RECORD NUMBER:  [id number]    The patient is being assessed for  Admission    I agree that at least one RN has performed a thorough Head to Toe Skin Assessment on the patient. ALL assessment sites listed below have been assessed.      Areas assessed by both nurses:    Head, Face, Ears, Shoulders, Back, Chest, Arms, Elbows, Hands, and Legs. Feet and Heels        Does the Patient have a Wound? No noted wound(s)       Bebeto Prevention initiated by RN: Yes  Wound Care Orders initiated by RN: No    Pressure Injury (Stage 3,4, Unstageable, DTI, NWPT, and Complex wounds) if present, place Wound referral order by RN under : No    New Ostomies, if present place, Ostomy referral order under : No     Nurse 1 eSignature: Electronically signed by Deborah Carty RN on 9/27/24 at 4:36 AM EDT    **SHARE this note so that the co-signing nurse can place an eSignature**    Nurse 2 eSignature: Electronically signed by Sena Adams RN on 9/27/24 at 6:45 AM EDT

## 2024-09-27 NOTE — ED NOTES
09/26/24  Jose Ocasio  [id number]  1960    Social Work Behavioral Health Crisis Assessment    Chief Complaint: The patient is a 63-year-old -American male who was sent in by police via pink slip after the patient went to a gun store and attempted to purchase a weapon to \"scare the neighbors into stopping the use of sound guns \".    Mental Status Exam:  Level of consciousness:  within normal limits   Appearance:  hospital attire, lying in bed, fair grooming, and fair hygiene.  Does not appear stated age. No acute distress.  Behavior/Motor:  no abnormalities noted  Attitude toward examiner:  cooperative  SI/HI:Denies SI/HI  Speech:  spontaneous, normal rate, normal volume, and well articulated , Tone: normal tone  Mood: within normal limits  Affect: flat  Thought Processes:  flight of ideas and loose associations.   Thought Content: Delusions:  paranoid and persecutory  Perceptual Disturbance:  illusions, auditory, and tactile  Hallucinations:  Hallucinations: +Tactile Hallucination  Cognition:  oriented to person, place, and time   Concentration: intact  Memory: intact, though not formally tested.  Insight: poor   Judgement: poor   Fund of Knowledge: excellent    Legal Status:  [] Voluntary:  [x] Involuntary, Issued by: Police  [] Probate    Brief Clinical Summary: Patient is a 63 y.o. AA male who presents for delusional thoughts. Patient presented to the ED via EMS on 09/26/24 from home.  Officer Grayson called from [place] [employer] stating that earlier in the day they received a call in reference to a noise complaint.  The patient reportedly claimed that neighbors were hurting his brain with sound guns and using vibration machines to drive him crazy.  He had denied at the time that he wanted to harm himself or others.  Approximately 5 hours later police were then alerted by a firearms dealer that the patient had attempted to purchase a gun \"in order to scare the neighbors into

## 2024-09-27 NOTE — CARE COORDINATION
Biopsychosocial Assessment Note    Social work met with patient to complete the biopsychosocial assessment and C-SSRS.     Chief Complaint: Pt stated that he is currently in the hospital because \"the police lied and said, now you've done it, you tried to buy a gun.\"    Mental Status Exam: Pt is alert and oriented x4. Pt's mood is anxious and labile, affect is unstable. Pt's speech is pressured, rate is fast and volume is loud. Pt's eye contact is intense. Pt's thoughts process is tangential, thought content is preoccupied and paranoid/delusional. Pt's memory is impaired, pt is a poor historian. Pt's insight and judgement is poor. Pt was cooperative and friendly during assessment and offered extensive irrelevant information. Pt denied SI, HI, AVH.      Clinical Summary: Pt is a 63-year-old male, who presented to the ER due to the police bringing him to the hospital after trying to purchase a gun. Per ED notes, \"presents for delusional thoughts. Patient presented to the ED via EMS on 09/26/24 from home.  Officer Grayson called from [place] [employer] stating that earlier in the day they received a call in reference to a noise complaint.  The patient reportedly claimed that neighbors were hurting his brain with sound guns and using vibration machines to drive him crazy.\"     Pt denied any previous history of inpatient mental health hospitalizations and denied currently being active with any outpatient mental health services. Pt denied being on any MH medications at this time. Pt denied having any suicidal ideations, plans, attempts or intent to end his life. Pt denied having any history of self harm. Pt denied any history of trauma or abuse. Pt stated that his main stressor is his neighbor who was using a sound gun to send music into his room and was making vibrations through the floors to harass him. Pt denied any substance use, UDS and SDS are both negative. Pt stated that his sleep and appetite have been

## 2024-09-27 NOTE — BH NOTE
Patient ate his dinner, appears to be acclimated to unit rules.  Has been on unit phone frequently but is currently in his room resting with his eyes closed.

## 2024-09-27 NOTE — BH NOTE
[facility]  Admission Note   Patient arrived to unit from section G via wheelchair. Patient ambulates independently with a steady gait. Alert and oriented x4. Upon arrival patient was calm, he completed the OQ analyst and filled out only the breakfast portion of the dietary menu, which was faxed to the cafeteria. I informed him that it wasn't complete and he stated that he doesn't plan to be here for lunch and dinner. During assessment, patient became very agitated and upset finding out that he was an involuntary hold. Paranoia and delusions noted. He continuously talks about sound guns and how no one believes him that it is real??he also states that his neighbors are tracking him and stalking him through his phone because the phone now has 5G speed instead 0f 4 G speed. Patient states that he graduated from [employer] and works as a . Patient was very elevated, tearful and exaggerated during assessment. He refused to sign any of the consents. This nurse gave the patient a tour of the unit and room. Patient went into his room and came directly back out to the day room crying and yelling. Stating that it is wrong that he is here and he wants to be discharged, nursing spoke with patient for extended period of time and finally calmed him down. Will continue to monitor and support.  Admission Type:   Admission Type: Involuntary    Reason for admission:  Reason for Admission: \"The police\"      Addictive Behavior:   Addictive Behavior  In the Past 3 Months, Have You Felt or Has Someone Told You That You Have a Problem With  : None    Medical Problems:   Past Medical History:   Diagnosis Date    Fracture of right wrist 1973    GERD (gastroesophageal reflux disease)     Heel spur     Plantar fasciitis     Sinus problem     cysts and polyps       Status EXAM:  Mental Status and Behavioral Exam  Normal: No  Level of Assistance: Independent/Self  Facial Expression: Elevated, Exaggerated  Affect:

## 2024-09-27 NOTE — GROUP NOTE
Group Therapy Note    Date: 9/27/2024    Group Start Time: 1000  Group End Time: 1040  Group Topic: Cognitive Skills    [facility] ACUTE BH 1    Becka Purdy MSW, LSW        Group Therapy Note    Attendees: 10       Patient's Goal:  Pt attended group therapy where we discussed the cognitive model.    Notes:  Pt was an active participant in group therapy.    Status After Intervention:  Improved    Participation Level: Active Listener and Interactive    Participation Quality: Appropriate, Attentive, and Sharing      Speech:  normal      Thought Process/Content: Logical      Affective Functioning: Congruent      Mood: anxious      Level of consciousness:  Alert, Attentive, and Preoccupied      Response to Learning: Able to verbalize current knowledge/experience and Able to retain information      Endings: None Reported    Modes of Intervention: Education, Support, Socialization, Exploration, Clarifying, and Problem-solving      Discipline Responsible: /Counselor      Signature:  KATJA Weeks, RYANN

## 2024-09-27 NOTE — GROUP NOTE
Date: 9/27/2024  Start Time: 1055  End Time:  1145  Number of Participants: 9    Type of Group: Psychoeducation    Wellness Binder Information  Module Name:  Music as a coping mechanism    Patient's Goal:  Patient will be able to ID at least one way music can help improve one's health.   Patient will be able to ID at least one way to incorporate music into one's coping mechanism.     Notes:  CTRS discussed the benefits of music in the body and mind.  CTRS then discussed how to utilize music as a coping mechanism.  Patient was an active participant in discussion and activity.  Patient was accepting of handout and was able to ID at least one song that can help improve mood.      Status After Intervention:  Improved    Participation Level: Active Listener and Interactive    Participation Quality: Attentive and Sharing      Speech:  pressured      Thought Process/Content: Flight of ideas      Affective Functioning: Congruent      Mood: elevated      Level of consciousness:  Alert and Attentive      Response to Learning: Able to verbalize current knowledge/experience, Able to verbalize/acknowledge new learning, and Progressing to goal      Endings: None Reported    Modes of Intervention: Education, Exploration, Clarifying, and Activity      Discipline Responsible: Recreational Therapist      Signature:  CRISTEL Beck

## 2024-09-27 NOTE — H&P
Department of Psychiatry  History and Physical - Adult     CHIEF COMPLAINT:    Chief Complaint   Patient presents with    Psychiatric Evaluation     Patient states that neighbors are trying to infiltrate his brain using an infrared sound gun. In order to protect himself he went to local gun shop to buy a gun to \"flash it for protection, but not use it\". Gun shop owner called [place] police, who then pink slipped patient and sent him here for psychiatric evaluation. Patient endorses auditory hallucinations, denies SI/HI.        Patient was seen after discussing with the treatment team and reviewing the chart    CIRCUMSTANCES OF ADMISSION:     Patient name: Jose Ocasio  Patient's past mental health and addiction history: Unknown past psychiatric history patient denies any past inpatient psychiatric hospitalizations  Patient's presentation to the ED and why the patient needs admission: Was endorsing paranoia, delusions, auditory hallucinations  Legal status:  []  Voluntary  [x]  Involuntary  []  Probate  Triggering/precipitating events: Patient went to local gun shop to buy a gun for protection as he was endorsing delusions and paranoia including neighbors trying to infiltrate his brain  Duration of triggering/precipitating events: Unknown    HISTORY OF PRESENT ILLNESS:      The patient is a 63 y.o. male with unknown past psychiatric history no inpatient psychiatric hospitalizations listed here at [facility] currently denies being prescribed any psychotropic medication presented to the ED on an involuntary hold by the police after they were called when patient attempted to buy a firearm well check was completed patient was found to be paranoid, delusional and brought into the ED.  Urine drug screen negative, blood alcohol level negative, QTc 430.  Patient was medically cleared and involuntarily admitted to [facility] for further psychiatric assessment stabilization and treatment.    Upon evaluation

## 2024-09-27 NOTE — ED PROVIDER NOTES
HPI:  9/26/24, Time: 9:05 PM EDT         Jose Ocasio is a 63 y.o. male history of acid reflux history of heel spur history of plantar fasciitis history of sinus problem presenting to the ED for psychiatric evaluation, beginning 1 day ago.  The complaint has been persistent, moderate in severity, and worsened by nothing.  Patient presenting here because of psychiatric valuation.  Patient reportedly pink slip by police.  Patient reportedly went to buy a gun because he believes that his downstairs neighbors were harassing him.  Patient reporting that he believes that there is new technology sound waves that are causing him here to hear things..Patient reporting no thoughts of harming himself or others.  He reports no headache reports no chest pain or difficulty breathing reports no abdominal pain or vomiting is no diarrhea.  There is no fall or injury    ROS:   Pertinent positives and negatives are stated within HPI, all other systems reviewed and are negative.  --------------------------------------------- PAST HISTORY ---------------------------------------------  Past Medical History:  has a past medical history of Fracture of right wrist, GERD (gastroesophageal reflux disease), Heel spur, Plantar fasciitis, and Sinus problem.    Past Surgical History:  has a past surgical history that includes other surgical history (1988); sinus surgery; Endoscopy, colon, diagnostic; Colonoscopy; and nasal/sinus endoscopy (12/08/2014).    Social History:  reports that he has never smoked. He has never used smokeless tobacco. He reports that he does not use drugs.    Family History: family history is not on file.     The patient’s home medications have been reviewed.    Allergies: Milk-related compounds, Molds & smuts, Cat hair extract, and Dust mite extract    ---------------------------------------------------PHYSICAL EXAM--------------------------------------    Constitutional/General: Alert and oriented x3,   Head:

## 2024-09-28 PROCEDURE — 6370000000 HC RX 637 (ALT 250 FOR IP): Performed by: NURSE PRACTITIONER

## 2024-09-28 PROCEDURE — 99232 SBSQ HOSP IP/OBS MODERATE 35: CPT | Performed by: NURSE PRACTITIONER

## 2024-09-28 PROCEDURE — 1240000000 HC EMOTIONAL WELLNESS R&B

## 2024-09-28 PROCEDURE — 94660 CPAP INITIATION&MGMT: CPT

## 2024-09-28 RX ADMIN — BENZOCAINE AND MENTHOL 1 LOZENGE: 15; 3.6 LOZENGE ORAL at 12:42

## 2024-09-28 RX ADMIN — BENZOCAINE AND MENTHOL 1 LOZENGE: 15; 3.6 LOZENGE ORAL at 23:36

## 2024-09-28 RX ADMIN — BENZOCAINE AND MENTHOL 1 LOZENGE: 15; 3.6 LOZENGE ORAL at 17:03

## 2024-09-28 RX ADMIN — BENZOCAINE AND MENTHOL 1 LOZENGE: 15; 3.6 LOZENGE ORAL at 10:28

## 2024-09-28 RX ADMIN — BENZOCAINE AND MENTHOL 1 LOZENGE: 15; 3.6 LOZENGE ORAL at 05:36

## 2024-09-28 ASSESSMENT — PAIN - FUNCTIONAL ASSESSMENT: PAIN_FUNCTIONAL_ASSESSMENT: ACTIVITIES ARE NOT PREVENTED

## 2024-09-28 ASSESSMENT — PAIN SCALES - GENERAL
PAINLEVEL_OUTOF10: 0
PAINLEVEL_OUTOF10: 0

## 2024-09-28 NOTE — BH NOTE
Spoke with pt dad pt did attend [employer] and pt father believes this is admission is a misunderstanding and he believes the police is mistaken pt for his brother because his brother is diagnosed with bipolar.Pt stated that he is only admitted because of a racist . And his neighbor is hacking his phone,bank account,passwords.

## 2024-09-28 NOTE — GROUP NOTE
Group Therapy Note    Date: 9/28/2024    Group Start Time: 1050  Group End Time: 1130  Group Topic: Cognitive Skills    [facility]    Maddison Bass LSW        Group Therapy Note    Attendees: 14       Patient's Goal:  Pts discussed thinking, feeling and coping in challenging situations.     Notes:  Pt actively participated in group.    Status After Intervention:  Improved    Participation Level: Active Listener and Interactive    Participation Quality: Appropriate, Attentive, Sharing, and Supportive      Speech:  normal      Thought Process/Content: Logical      Affective Functioning: Congruent      Mood: euthymic      Level of consciousness:  Alert      Response to Learning: Able to verbalize current knowledge/experience, Able to verbalize/acknowledge new learning, and Progressing to goal      Endings: None Reported    Modes of Intervention: Education, Support, Socialization, Exploration, Clarifying, and Problem-solving      Discipline Responsible: /Counselor      Signature:  RYANN Villagran

## 2024-09-28 NOTE — GROUP NOTE
Group Therapy Note    Date: 9/28/2024    Group Start Time: 0945  Group End Time: 1020  Group Topic: Psychoeducation    Marta Wing CTRS    Group Therapy Note    Attendees: 15    Date: 9/28/2024  Start Time: 0945  End Time:  1020  Number of Participants: 15    Type of Group: Psychoeducation    Name:  Stages of Change/Processing Change    Patient's Goal:  Increased awareness of stages of change. Identified healthy ways to process changes.    Notes:  CTRS led educational group discussion on change. Encouraged patients to share their experiences. Patient was actively engaged in group discussion and made positive responses.    Status After Intervention:  Improved    Participation Level: Active Listener and Interactive    Participation Quality: Appropriate, Attentive, and Sharing      Speech:  normal      Thought Process/Content: Logical  Linear      Affective Functioning: Congruent      Mood:  Appropriate      Level of consciousness:  Alert and Attentive      Response to Learning: Able to verbalize current knowledge/experience, Able to verbalize/acknowledge new learning, Able to retain information, Capable of insight, Able to change behavior, and Progressing to goal      Endings: None Reported    Modes of Intervention: Education, Support, Socialization, Exploration, Clarifying, and Problem-solving      Discipline Responsible: Psychoeducational Specialist      Signature:  CRISTEL Montalvo

## 2024-09-29 PROCEDURE — 94660 CPAP INITIATION&MGMT: CPT

## 2024-09-29 PROCEDURE — 1240000000 HC EMOTIONAL WELLNESS R&B

## 2024-09-29 PROCEDURE — 6370000000 HC RX 637 (ALT 250 FOR IP): Performed by: NURSE PRACTITIONER

## 2024-09-29 PROCEDURE — 99232 SBSQ HOSP IP/OBS MODERATE 35: CPT

## 2024-09-29 RX ADMIN — BENZOCAINE AND MENTHOL 1 LOZENGE: 15; 3.6 LOZENGE ORAL at 09:02

## 2024-09-29 RX ADMIN — BENZOCAINE AND MENTHOL 1 LOZENGE: 15; 3.6 LOZENGE ORAL at 23:21

## 2024-09-29 RX ADMIN — BENZOCAINE AND MENTHOL 1 LOZENGE: 15; 3.6 LOZENGE ORAL at 21:14

## 2024-09-29 RX ADMIN — BENZOCAINE AND MENTHOL 1 LOZENGE: 15; 3.6 LOZENGE ORAL at 17:18

## 2024-09-29 ASSESSMENT — PAIN SCALES - GENERAL: PAINLEVEL_OUTOF10: 0

## 2024-09-29 NOTE — GROUP NOTE
Group Therapy Note    Date: 9/29/2024    Group Start Time: 0945  Group End Time: 1020  Group Topic: Psychoeducation    Marta Wing CTRS    Group Therapy Note    Attendees: 14    Date: 9/29/2024  Start Time: 0945  End Time:  1020  Number of Participants: 14    Type of Group: Psychoeducation    Name:  Positive Thinking    Patient's Goal:  Identify what is positive thinking, types of negative thinking and ways to change thinking patterns.    Notes:  CTRS led educational group discussion on positive thinking. Encouraged patients to share their experiences. Patient was actively engaged in group discussion and made positive responses.    Status After Intervention:  Improved    Participation Level: Active Listener and Interactive    Participation Quality: Appropriate, Attentive, and Sharing      Speech:  normal      Thought Process/Content: Logical  Linear      Affective Functioning: Congruent      Mood:  Appropriate      Level of consciousness:  Alert and Attentive      Response to Learning: Able to verbalize current knowledge/experience, Able to verbalize/acknowledge new learning, Able to retain information, Capable of insight, Able to change behavior, and Progressing to goal      Endings: None Reported    Modes of Intervention: Education, Support, Socialization, Exploration, Clarifying, and Problem-solving      Discipline Responsible: Psychoeducational Specialist      Signature:  CRISTEL Montalvo

## 2024-09-29 NOTE — GROUP NOTE
Group Therapy Note    Date: 9/29/2024    Group Start Time: 1100  Group End Time: 1145  Group Topic: Cognitive Skills    [facility]    Maddison Bass LSW        Group Therapy Note    Attendees: 11       Patient's Goal:  Pts discussed how music is beneficial to self care and requested a song to be played.    Notes:  Pt actively participated in group.     Status After Intervention:  Improved    Participation Level: Active Listener    Participation Quality: Appropriate      Speech:  normal      Thought Process/Content: Logical      Affective Functioning: Congruent      Mood: euthymic      Level of consciousness:  Alert      Response to Learning: Progressing to goal      Endings: None Reported    Modes of Intervention: Education, Support, Socialization, Exploration, Clarifying, and Problem-solving      Discipline Responsible: /Counselor      Signature:  RYANN Villagran

## 2024-09-29 NOTE — GROUP NOTE
Group Therapy Note    Date: 9/29/2024    Group Start Time: 0930  Group End Time: 0945  Group Topic: Community Meeting    Marta Wing CTRS    Group Therapy Note    Attendees: 14    Date: 9/29/2024  Start Time: 0930  End Time:  0945  Number of Participants: 14    Type of Group: Community Meeting    Patient's Goal:  Increased awareness of functions of the unit, programming and staffing.    Notes:  Patient was an active listener in group. Patient shared goal for the day as, \"Focus on thinking about what I can do to accelerate my discharge.\"    Status After Intervention:  Improved    Participation Level: Active Listener and Interactive    Participation Quality: Appropriate, Attentive, and Sharing      Speech:  normal      Thought Process/Content: Logical  Linear      Affective Functioning: Congruent      Mood:  Appropriate      Level of consciousness:  Alert and Attentive      Response to Learning: Able to verbalize current knowledge/experience, Able to verbalize/acknowledge new learning, Able to retain information, Capable of insight, Able to change behavior, and Progressing to goal      Endings: None Reported    Modes of Intervention: Education, Support, Socialization, Exploration, Clarifying, and Problem-solving      Discipline Responsible: Psychoeducational Specialist      Signature:  CRISTEL Montalvo

## 2024-09-30 PROCEDURE — 6370000000 HC RX 637 (ALT 250 FOR IP): Performed by: NURSE PRACTITIONER

## 2024-09-30 PROCEDURE — 99232 SBSQ HOSP IP/OBS MODERATE 35: CPT | Performed by: NURSE PRACTITIONER

## 2024-09-30 PROCEDURE — 1240000000 HC EMOTIONAL WELLNESS R&B

## 2024-09-30 PROCEDURE — 94660 CPAP INITIATION&MGMT: CPT

## 2024-09-30 RX ORDER — GUAIFENESIN 400 MG/1
400 TABLET ORAL 3 TIMES DAILY
Status: DISCONTINUED | OUTPATIENT
Start: 2024-09-30 | End: 2024-10-08

## 2024-09-30 RX ADMIN — GUAIFENESIN 400 MG: 400 TABLET ORAL at 21:25

## 2024-09-30 RX ADMIN — BENZOCAINE AND MENTHOL 1 LOZENGE: 15; 3.6 LOZENGE ORAL at 15:47

## 2024-09-30 RX ADMIN — BENZOCAINE AND MENTHOL 1 LOZENGE: 15; 3.6 LOZENGE ORAL at 20:19

## 2024-09-30 RX ADMIN — BENZOCAINE AND MENTHOL 1 LOZENGE: 15; 3.6 LOZENGE ORAL at 05:45

## 2024-09-30 RX ADMIN — BENZOCAINE AND MENTHOL 1 LOZENGE: 15; 3.6 LOZENGE ORAL at 18:02

## 2024-09-30 ASSESSMENT — PAIN SCALES - GENERAL
PAINLEVEL_OUTOF10: 0
PAINLEVEL_OUTOF10: 3

## 2024-09-30 ASSESSMENT — PAIN - FUNCTIONAL ASSESSMENT: PAIN_FUNCTIONAL_ASSESSMENT: ACTIVITIES ARE NOT PREVENTED

## 2024-09-30 NOTE — GROUP NOTE
Group Therapy Note    Date: 9/30/2024    Group Start Time: 1045  Group End Time: 1135  Group Topic: Psychotherapy    [facility]    Becka Purdy, RYANN HIGHTOWER        Group Therapy Note    Attendees: 13       Patient's Goal:  To increase social interaction and improve relationships with others.      Notes:  Pt was attentive in group and was able to identify an agenda. They were also able to verbalize relating to others within the group.     Status After Intervention:  Unchanged    Participation Level: Active Listener and Interactive    Participation Quality: Attentive and Sharing      Speech:  normal      Thought Process/Content: Delusional      Affective Functioning: Congruent      Mood: anxious      Level of consciousness:  Alert, Oriented x4, and Attentive      Response to Learning: Able to verbalize current knowledge/experience and Able to retain information      Endings: None Reported    Modes of Intervention: Education, Support, Socialization, Exploration, Clarifying, and Problem-solving      Discipline Responsible: /Counselor      Signature:  KATJA Weeks, RYANN

## 2024-09-30 NOTE — CARE COORDINATION
Pt came to  and stated that he was on the other unit and after he disclosed that he graduated from [employer] he was transferred to this unit.  Pt stated that \"they are telling me I need an antipsychotic because I am delusional and dangerous.\"  Sw asked pt if he thought he was delusional and dangerous and he stated that he got one speeding ticket sixteen years ago.  Pt denies ever taking an antipsychotic and said \"and I never will.\"  Asked pt if he would like the pt advocate phone number and gave him the contact information.  Prashanth asked pt if he signed an AURELIANO and he said that he did for his father.

## 2024-09-30 NOTE — CARE COORDINATION
Sw approached pt and stated that we do not have an AURELIANO signed for his father.  Pt stated that he already completed one \"with his address and everything.\"  Sw looked in soft chart and he had an AURELIANO that had his father's name and address listed but it was not signed and nor was there a phone number provided.  Pt signed an AURELIANO for his father, Dr. John Ocasio [phone].

## 2024-09-30 NOTE — GROUP NOTE
Group Therapy Note    Date: 9/30/2024    Group Start Time: 1400  Group End Time: 1445  Group Topic: Psychoeducation    Marta Wing CTRS    Group Therapy Note    Attendees: 11    Date: 9/30/2024  Start Time: 1400  End Time:  1445  Number of Participants: 11    Type of Group: Psychoeducation    Name:  Finding Balance/Pie of Life Activity    Patient's Goal:  Increased awareness of why finding balance is beneficial for mental health, identify ways to find/maintain balance. Patients completed pie of life activity where they filled out a pie chart of how they spend each hour of the day to increase awareness of time usage.     Notes:  CTRS led educational discussion on finding balance. Encouraged patients to share their experiences. Patient was actively engaged in group discussion and completed pie of life activity.     Status After Intervention:  Improved    Participation Level: Active Listener and Interactive    Participation Quality: Appropriate, Attentive, and Sharing      Speech:  normal      Thought Process/Content: Logical  Linear      Affective Functioning: Congruent      Mood:  Appropriate      Level of consciousness:  Alert and Attentive      Response to Learning: Able to verbalize current knowledge/experience, Able to verbalize/acknowledge new learning, Able to retain information, Capable of insight, Able to change behavior, and Progressing to goal      Endings: None Reported    Modes of Intervention: Education, Support, Socialization, Exploration, Clarifying, Problem-solving, and Activity      Discipline Responsible: Psychoeducational Specialist      Signature:  CRISTEL Montalvo

## 2024-10-01 PROCEDURE — 6370000000 HC RX 637 (ALT 250 FOR IP): Performed by: NURSE PRACTITIONER

## 2024-10-01 PROCEDURE — 1240000000 HC EMOTIONAL WELLNESS R&B

## 2024-10-01 PROCEDURE — 94660 CPAP INITIATION&MGMT: CPT

## 2024-10-01 RX ADMIN — GUAIFENESIN 400 MG: 400 TABLET ORAL at 14:11

## 2024-10-01 RX ADMIN — GUAIFENESIN 400 MG: 400 TABLET ORAL at 21:02

## 2024-10-01 RX ADMIN — BENZOCAINE AND MENTHOL 1 LOZENGE: 15; 3.6 LOZENGE ORAL at 00:11

## 2024-10-01 RX ADMIN — GUAIFENESIN 400 MG: 400 TABLET ORAL at 09:11

## 2024-10-01 ASSESSMENT — PAIN SCALES - GENERAL
PAINLEVEL_OUTOF10: 4
PAINLEVEL_OUTOF10: 0

## 2024-10-01 NOTE — DISCHARGE INSTRUCTIONS
[facility] [place] [facility]   [address] [place] [id number]    Phone: [phone]   Fax [phone]     [facility]   [address] [address]   Phone: [phone] [address]   Fax: [phone]     [facility]   [address] [address]   Phone: [phone]   Fax: [phone]     [facility]- children only (18 and younger)  [address] [address]   Phone: [phone]   Fax: [phone]     [facility]   [address] [address]   Phone: [phone]   Fax: [phone]     [facility]   [address] [address]   Phone: [phone]   Fax: [phone]     [place] [address]   Phone: [phone]   Fax: [phone]     [facility]   [address] [place] [address]    Phone: [phone]   Fax: [phone]     [facility]   Address: [address] [place] [address]   Phone: [phone]   Fax: [phone]     [facility]   [address] [address]   Phone: [phone]   Fax: [phone]   Please enter at the main entrance and go down the toussaint to elevator B, go up to the 7th floor, check in at the first door in the left hallway.     [facility]   [address] [place] [address]   Phone: [phone]   Fax: [phone]     [facility]   [address] [place] [address]   Phone: [phone]   Fax: [phone]     [facility] students only)   [address] [address]   Phone: [phone]   Fax: [phone]     [facility]   [address] [facility], [place] [address]   Phone: [phone]   Fax:

## 2024-10-01 NOTE — GROUP NOTE
Group Therapy Note    Date: 10/1/2024    Group Start Time: 0930  Group End Time: 1020  Group Topic: Psychoeducation    Marat Wing CTRS    Group Therapy Note    Attendees: 17    Date: 10/1/2024  Start Time: 0930  End Time:  1020  Number of Participants: 17    Type of Group: Psychoeducation    Name:  Improving Mood    Patient's Goal:  Identify what is mood, factors that influence mood, types of mood disorders and ways to improve mood.    Notes:  CTRS led educational group discussion on improving mood. Encouraged patients to share their experiences. Patient was actively engaged in group discussion and made positive responses.    Status After Intervention:  Improved    Participation Level: Active Listener and Interactive    Participation Quality: Appropriate, Attentive, and Sharing      Speech:  normal      Thought Process/Content: Logical  Linear      Affective Functioning: Congruent      Mood:  Appropriate      Level of consciousness:  Alert and Attentive      Response to Learning: Able to verbalize current knowledge/experience, Able to verbalize/acknowledge new learning, Able to retain information, Capable of insight, Able to change behavior, and Progressing to goal      Endings: None Reported    Modes of Intervention: Education, Support, Socialization, Exploration, Clarifying, and Problem-solving      Discipline Responsible: Psychoeducational Specialist      Signature:  CRISTEL Montalvo

## 2024-10-01 NOTE — CARE COORDINATION
SW met with pt to discuss outpatient mental health follow up. Pt states that he does not want SW to refer him for any mental health services, that he does not plan to take any mental health medications after discharge and does not feel that he needs follow up appointments in place. He is agreeable to SW placing resources in his discharge paperwork in case he would like to follow up for mental health in the future.     SW placed mental health resources in pt discharge summary.

## 2024-10-01 NOTE — GROUP NOTE
Group Therapy Note    Date: 10/1/2024    Group Start Time: 1430  Group End Time: 1500  Group Topic: Recreational    Marta Wing CTRS    Group Therapy Note    Attendees: 11    Date: 10/1/2024  Start Time: 1430  End Time:  1500  Number of Participants: 11    Type of Group: Recreational    Name:  October Trivia    Patient's Goal:  Increased awareness of October facts/history. Provided reality orientation as needed.    Notes:  Patient was actively engaged in group activity. Patient answered questions appropriately and engaged in group discussion.    Status After Intervention:  Improved    Participation Level: Active Listener and Interactive    Participation Quality: Appropriate, Attentive, Sharing, and Supportive      Speech:  normal      Thought Process/Content: Logical  Linear      Affective Functioning: Congruent      Mood:  Appropriate      Level of consciousness:  Alert and Attentive      Response to Learning: Able to verbalize current knowledge/experience, Able to verbalize/acknowledge new learning, Able to retain information, and Progressing to goal      Endings: None Reported    Modes of Intervention: Socialization and Activity      Discipline Responsible: Psychoeducational Specialist      Signature:  CRISTEL Montalvo

## 2024-10-01 NOTE — BH NOTE
Pt made a list of medications he would like to have that he takes at home:  \"Afrin Nasal Spray\"  \"Budesonide Nasal Spray\"  \"Olapatidine Nasal Spray (Pantanese)\"  \"Ipratropium Bromide Nasal Spray\"  \"Ventrolin Inhaler\"

## 2024-10-01 NOTE — BH NOTE
Refused scheduled morning Depakote, and also refused scheduled afternoon Depakote dose. See eMar.

## 2024-10-01 NOTE — CARE COORDINATION
Additional Collateral    Navigator placed phone call to [facility] [phone] and spoke with  Mahesh.    Per Mahesh, they had no previous other interactions with patient's prior to 9/24/24.    Per Mahesh, patient called in on 9/24 with noise complaint of his downstairs neighbors and hearing things. Patient called again on 9/25. Patient was pink slipped on 9/26/24.    Electronically signed by KATJA Villanueva LSW on 10/1/2024 at 12:15 PM

## 2024-10-01 NOTE — GROUP NOTE
Group Therapy Note    Date: 10/1/2024    Group Start Time: 1045  Group End Time: 1120  Group Topic: Cognitive Skills    [facility]    Madison Stephens MSW, LSW        Group Therapy Note    Attendees: 16       Patient's Goal:  Pt will be able to identify their protective factors and what factors they utilize in difficult situations and the factors they would like to work on.     Notes:  pt participated in group and made connections.     Status After Intervention:  Improved    Participation Level: Active Listener and Interactive    Participation Quality: Appropriate, Attentive, and Sharing      Speech:  normal      Thought Process/Content: Logical  Linear      Affective Functioning: Congruent      Mood: anxious      Level of consciousness:  Alert, Oriented x4, and Attentive      Response to Learning: Able to verbalize current knowledge/experience, Able to verbalize/acknowledge new learning, Able to retain information, and Capable of insight      Endings: None Reported    Modes of Intervention: Education, Support, Socialization, Exploration, Clarifying, and Problem-solving      Discipline Responsible: /Counselor      Signature:  KATJA Perales LSW

## 2024-10-01 NOTE — CARE COORDINATION
DURGA contacted pt father Dr. John Ocasio [phone] (AURELIANO signed) to gain collateral. No answer, DURGA left voicemail.     UPDATE: DURGA received call from pt father. He states that he visited pt yesterday and pt is fine, seems like himself. He states that pt is not delusional. He states that pt did graduate from [employer]. He states that pt has not been making any bizarre/delusional statements to him. He does not feel pt needs to be admitted to the hospital. He states that pt lives alone and will return at discharge. He denied pt access to guns/weapons. He states that he is able to transport pt home at discharge. He provided [phone] as his cell phone number as he will not be home all day.

## 2024-10-01 NOTE — GROUP NOTE
Shared goal for the day as to continue to remain calm cool and collected.                                                                       Group Therapy Note    Date: 10/1/2024    Group Start Time: 0830  Group End Time: 0850  Group Topic: Community Meeting    [facility]    Sheela Novoa, YANES    Date: 10/1/2024  Type of Group: Community Meeting    Wellness Binder Information  Module Name:  Type of Group: Community Meeting      Patient's Goal:  Patient will be able to id staffing assignments, expectations of patients, and general information re: floor rules. Will be prompted to share goal for the day.     Notes:  Patient appeared to be an active listener, taking in information presented and was prompted to share goal for the day.    Status After Intervention:  Improved    Participation Level: Active Listener and Interactive    Participation Quality: Appropriate, Attentive, and Sharing      Speech:  normal      Thought Process/Content: Logical      Affective Functioning: Congruent      Mood: euthymic      Level of consciousness:  Alert, Oriented x4, and Attentive      Response to Learning: Able to verbalize/acknowledge new learning, Able to retain information, and Progressing to goal      Endings: None Reported    Modes of Intervention: Education, Support, Socialization, and Clarifying      Discipline Responsible: Psychoeducational Specialist      Signature:  YANE FranzS

## 2024-10-02 PROCEDURE — 94660 CPAP INITIATION&MGMT: CPT

## 2024-10-02 PROCEDURE — 6370000000 HC RX 637 (ALT 250 FOR IP): Performed by: NURSE PRACTITIONER

## 2024-10-02 PROCEDURE — 1240000000 HC EMOTIONAL WELLNESS R&B

## 2024-10-02 RX ADMIN — GUAIFENESIN 400 MG: 400 TABLET ORAL at 09:25

## 2024-10-02 RX ADMIN — GUAIFENESIN 400 MG: 400 TABLET ORAL at 20:00

## 2024-10-02 RX ADMIN — BENZOCAINE AND MENTHOL 1 LOZENGE: 15; 3.6 LOZENGE ORAL at 15:59

## 2024-10-02 RX ADMIN — BENZOCAINE AND MENTHOL 1 LOZENGE: 15; 3.6 LOZENGE ORAL at 20:00

## 2024-10-02 RX ADMIN — GUAIFENESIN 400 MG: 400 TABLET ORAL at 15:28

## 2024-10-02 RX ADMIN — BENZOCAINE AND MENTHOL 1 LOZENGE: 15; 3.6 LOZENGE ORAL at 09:25

## 2024-10-02 ASSESSMENT — PAIN DESCRIPTION - ORIENTATION: ORIENTATION: INNER

## 2024-10-02 ASSESSMENT — PAIN - FUNCTIONAL ASSESSMENT: PAIN_FUNCTIONAL_ASSESSMENT: ACTIVITIES ARE NOT PREVENTED

## 2024-10-02 ASSESSMENT — PAIN SCALES - GENERAL
PAINLEVEL_OUTOF10: 2
PAINLEVEL_OUTOF10: 0

## 2024-10-02 ASSESSMENT — PAIN DESCRIPTION - DESCRIPTORS: DESCRIPTORS: SORE

## 2024-10-02 ASSESSMENT — PAIN DESCRIPTION - LOCATION: LOCATION: THROAT

## 2024-10-02 NOTE — GROUP NOTE
Group Therapy Note    Date: 10/2/2024    Group Start Time: 0945  Group End Time: 1028  Group Topic: Psychoeducation    [facility]    Sheela Novoa, CTRS    Date: 10/2/2024  Module Name: Communication: 2 sides     Patient's Goal:  pt will be able to id what one can do to communicate clearer, and be a better listener.     Notes:  Pleasant and engaged in group, sharing and accepting of handout.     Status After Intervention:  Improved    Participation Level: Active Listener and Interactive    Participation Quality: Appropriate, Attentive, and Sharing      Speech:  normal      Thought Process/Content: Logical      Affective Functioning: Congruent      Mood: euthymic      Level of consciousness:  Alert, Oriented x4, and Attentive      Response to Learning: Able to verbalize/acknowledge new learning, Able to retain information, and Progressing to goal      Endings: None Reported    Modes of Intervention: Education, Support, Socialization, and Clarifying      Discipline Responsible: Psychoeducational Specialist      Signature:  CRISTEL Franz

## 2024-10-02 NOTE — GROUP NOTE
Group Therapy Note    Date: 10/2/2024    Group Start Time: 1530  Group End Time: 1630  Group Topic: Recreational    [facility]    Sheela Novoa, CTRS    Date: 10/2/2024  Module Name:  Dominoes and Classical Music     Patient's Goal:  Pts will be able to engage in group activity of dominoes while others listened to classical music.     Notes:  Pleasant and engaged in rec activity.     Status After Intervention:  Improved    Participation Level: Active Listener and Interactive    Participation Quality: Appropriate, Attentive, and Sharing      Speech:  normal      Thought Process/Content: Logical      Affective Functioning: Congruent      Mood: euthymic      Level of consciousness:  Alert, Oriented x4, and Attentive      Response to Learning: Able to verbalize/acknowledge new learning, Able to retain information, and Progressing to goal      Endings: None Reported    Modes of Intervention: Support, Activity, and Media      Discipline Responsible: Psychoeducational Specialist      Signature:  Sheela Novoa, CTRS

## 2024-10-02 NOTE — BH NOTE
Pt laying in bed upon approach. Pt A&O x4. Pt denies SI, HI, and AVH. Pt reports anxiety 0/10 and depression 0/10. Pt has flat affect and appears irritable. Pt notes that they are eating and sleeping well. Pt not compliant with PM medications. According to previous notes pt attended groups. Purposeful Q15min rounding continues.

## 2024-10-02 NOTE — GROUP NOTE
Group Therapy Note    Date: 10/2/2024    Group Start Time: 1100  Group End Time: 1140  Group Topic: Psychotherapy    [facility]    Becka Purdy MSW, LSW        Group Therapy Note    Attendees: 10       Patient's Goal:  To increase social interaction and improve relationships with others.      Notes:  Pt was attentive in group and was able to identify an agenda. They were also able to verbalize relating to others within the group.     Status After Intervention:  Improved    Participation Level: Active Listener and Interactive    Participation Quality: Appropriate, Attentive, and Sharing      Speech:  normal      Thought Process/Content: Logical      Affective Functioning: Congruent      Mood: euthymic      Level of consciousness:  Alert, Oriented x4, and Attentive      Response to Learning: Able to verbalize current knowledge/experience and Able to retain information      Endings: None Reported    Modes of Intervention: Education, Support, Socialization, Exploration, Clarifying, and Problem-solving      Discipline Responsible: /Counselor      Signature:  KATJA Weeks, RYANN

## 2024-10-02 NOTE — BH NOTE
Pt resting in bed with eyes closed. Respirations even and non labored. Purposeful Q15min rounding continues. Will continue to monitor.

## 2024-10-02 NOTE — GROUP NOTE
Shared goal for the day as to expedite my release.                                                                       Group Therapy Note    Date: 10/2/2024    Group Start Time: 0932  Group End Time: 0945  Group Topic: Community Meeting    [facility]  1    Sheela Novoa CTRS    Type of Group: Community Meeting      Patient's Goal:  Patient will be able to id staffing assignments, expectations of patients, and general information re: floor rules. Will be prompted to share goal for the day.     Notes:  Patient appeared to be an active listener, taking in information presented and was prompted to share goal for the day.    Status After Intervention:  Improved    Participation Level: Active Listener and Interactive    Participation Quality: Appropriate, Attentive, and Sharing      Speech:  normal      Thought Process/Content: Logical      Affective Functioning: Congruent      Mood: euthymic      Level of consciousness:  Alert, Oriented x4, and Attentive      Response to Learning: Able to verbalize/acknowledge new learning, Able to retain information, and Progressing to goal      Endings: None Reported    Modes of Intervention: Education, Support, and Clarifying      Discipline Responsible: Psychoeducational Specialist      Signature:  CRISTEL Franz

## 2024-10-03 PROCEDURE — 1240000000 HC EMOTIONAL WELLNESS R&B

## 2024-10-03 PROCEDURE — 6370000000 HC RX 637 (ALT 250 FOR IP): Performed by: NURSE PRACTITIONER

## 2024-10-03 PROCEDURE — 6370000000 HC RX 637 (ALT 250 FOR IP): Performed by: PSYCHIATRY & NEUROLOGY

## 2024-10-03 PROCEDURE — 94660 CPAP INITIATION&MGMT: CPT

## 2024-10-03 RX ADMIN — GUAIFENESIN 400 MG: 400 TABLET ORAL at 23:05

## 2024-10-03 RX ADMIN — ACETAMINOPHEN 650 MG: 325 TABLET ORAL at 23:05

## 2024-10-03 RX ADMIN — BENZOCAINE AND MENTHOL 1 LOZENGE: 15; 3.6 LOZENGE ORAL at 23:12

## 2024-10-03 RX ADMIN — GUAIFENESIN 400 MG: 400 TABLET ORAL at 14:11

## 2024-10-03 RX ADMIN — ACETAMINOPHEN 650 MG: 325 TABLET ORAL at 05:52

## 2024-10-03 RX ADMIN — GUAIFENESIN 400 MG: 400 TABLET ORAL at 09:01

## 2024-10-03 RX ADMIN — BENZOCAINE AND MENTHOL 1 LOZENGE: 15; 3.6 LOZENGE ORAL at 04:11

## 2024-10-03 RX ADMIN — BENZOCAINE AND MENTHOL 1 LOZENGE: 15; 3.6 LOZENGE ORAL at 09:03

## 2024-10-03 ASSESSMENT — PAIN DESCRIPTION - LOCATION
LOCATION: KNEE
LOCATION: NECK;BACK;SHOULDER
LOCATION: THROAT

## 2024-10-03 ASSESSMENT — PAIN DESCRIPTION - DESCRIPTORS
DESCRIPTORS: ACHING
DESCRIPTORS: SORE
DESCRIPTORS: SPASM

## 2024-10-03 ASSESSMENT — PAIN DESCRIPTION - ORIENTATION
ORIENTATION: UPPER
ORIENTATION: LEFT

## 2024-10-03 ASSESSMENT — PAIN SCALES - GENERAL
PAINLEVEL_OUTOF10: 4
PAINLEVEL_OUTOF10: 6
PAINLEVEL_OUTOF10: 0
PAINLEVEL_OUTOF10: 4

## 2024-10-03 NOTE — CARE COORDINATION
DURGA met with pt to discuss discharge plan. Pt stated that he is feeling the same today as he has been feeling. Pt stated that he will return back home when he is discharged from the hospital and he will be able to get a taxi or uber to return home. Pt is hopeful that he will be discharged soon. Pt denied access to any guns/weapons. Pt denied wanting any follow up mental health services at this time. Pt stated that since coming here he has felt numb, but reported that this feeling will go away when he is discharged. Pt denied any SI, HI, AVH, depression or anxiety.

## 2024-10-03 NOTE — GROUP NOTE
Group Therapy Note    Date: 10/3/2024    Group Start Time: 0925  Group End Time: 0940  Group Topic: Community Meeting    Marta Wing CTRS    Group Therapy Note    Attendees: 12    Date: 10/3/2024  Start Time: 0925  End Time:  0940  Number of Participants: 12    Type of Group: Community Meeting    Patient's Goal:  Increased awareness of functions of milieu, staff and programming.     Notes:  Patient was an active listener in group. Shared goal for the day as, \"Contact patient advocate.\"    Status After Intervention:  Improved    Participation Level: Active Listener and Interactive    Participation Quality: Appropriate, Attentive, and Sharing      Speech:  normal      Thought Process/Content: Logical  Linear      Affective Functioning: Congruent      Mood:  Appropriate      Level of consciousness:  Alert and Attentive      Response to Learning: Able to verbalize current knowledge/experience, Able to verbalize/acknowledge new learning, Able to retain information, Capable of insight, Able to change behavior, and Progressing to goal      Endings: None Reported    Modes of Intervention: Education, Support, Socialization, Exploration, Clarifying, and Problem-solving      Discipline Responsible: Psychoeducational Specialist      Signature:  CRISTEL Montalvo

## 2024-10-03 NOTE — GROUP NOTE
Group Therapy Note    Date: 10/3/2024    Group Start Time: 0940  Group End Time: 1015  Group Topic: Psychoeducation    Marta Wing CTRS    Group Therapy Note    Attendees: 13    Date: 10/3/2024  Start Time: 0940  End Time:  1015  Number of Participants: 13    Type of Group: Psychoeducation    Name:  Mindfulness    Patient's Goal:  Increased awareness of what mindfulness is and how it affects mental health. Identified ways to add mindfulness to every day activities.    Notes:  CTRS led educational group on mindfulness. Encouraged patients to share their experiences. Patient was actively engaged in group discussion.    Status After Intervention:  Improved    Participation Level: Active Listener and Interactive    Participation Quality: Appropriate, Attentive, and Sharing      Speech:  normal      Thought Process/Content: Logical  Linear      Affective Functioning: Congruent      Mood: Appropriate      Level of consciousness:  Alert and Attentive      Response to Learning: Able to verbalize current knowledge/experience, Able to verbalize/acknowledge new learning, Able to retain information, Capable of insight, Able to change behavior, and Progressing to goal      Endings: None Reported    Modes of Intervention: Education, Support, Socialization, Exploration, Clarifying, and Problem-solving      Discipline Responsible: Psychoeducational Specialist      Signature:  CRISTEL Montalvo

## 2024-10-03 NOTE — BH NOTE
Pt denies suicidal / homicidal ideations.   Pt denies hallucinations.   Pt is cooperative at this time.  No other immediate mental distress.     Pt to go to CT SCAN at approx: 2:00 pm. SONAL Valerio advised.

## 2024-10-03 NOTE — GROUP NOTE
Group Therapy Note    Date: 10/3/2024    Group Start Time: 1100  Group End Time: 1145  Group Topic: Psychotherapy    [facility] 1    Becka Purdy MSW, LSW        Group Therapy Note    Attendees: 7       Patient's Goal:  To increase social interaction and improve relationships with others.      Notes:  Pt was attentive in group and was able to identify an agenda. They were also able to verbalize relating to others within the group.     Status After Intervention:  Improved    Participation Level: Active Listener and Interactive    Participation Quality: Appropriate, Attentive, and Sharing      Speech:  normal      Thought Process/Content: Logical      Affective Functioning: Congruent      Mood: euthymic      Level of consciousness:  Alert, Oriented x4, and Attentive      Response to Learning: Able to verbalize current knowledge/experience, Able to verbalize/acknowledge new learning, and Able to retain information      Endings: None Reported    Modes of Intervention: Education, Support, Socialization, Exploration, Clarifying, and Problem-solving      Discipline Responsible: /Counselor      Signature:  KATJA Weeks, RYANN

## 2024-10-04 PROCEDURE — 94660 CPAP INITIATION&MGMT: CPT

## 2024-10-04 PROCEDURE — 6370000000 HC RX 637 (ALT 250 FOR IP): Performed by: NURSE PRACTITIONER

## 2024-10-04 PROCEDURE — 1240000000 HC EMOTIONAL WELLNESS R&B

## 2024-10-04 RX ADMIN — GUAIFENESIN 400 MG: 400 TABLET ORAL at 14:39

## 2024-10-04 RX ADMIN — GUAIFENESIN 400 MG: 400 TABLET ORAL at 10:40

## 2024-10-04 RX ADMIN — GUAIFENESIN 400 MG: 400 TABLET ORAL at 21:11

## 2024-10-04 NOTE — CARE COORDINATION
Navigator provided patient a copy of his court outcomes. Briefly reviewed. Patient receptive to the paperwork. Patient verbalized understanding. Appears calmer, sad/subdued. Patient reports that he has been talking with his father. Patient thanked this writer and confirmed plan to save any questions for this writer for Monday.    Electronically signed by KATJA Villanueva LSW on 10/4/2024 at 3:10 PM

## 2024-10-04 NOTE — BH NOTE
Pt speaking with  upon approach.   Pt appears irritable with exaggerated facial expressions while discussing court hearing. Pt A&O x4.   Pt denies SI, HI, and AVH.   Pt reports anxiety 0/10 and depression 0/10. States \"I just feel numb\" after discussing court hearing. Pt focused on finding police report and worried about meds being forced on him.  Pt notes that they are eating and sleeping well. But believes he will not be able to sleep due to his court case tomorrow.   Pt not compliant with PM medications.   According to previous notes pt attended groups.   Purposeful Q15min rounding continues.

## 2024-10-04 NOTE — CARE COORDINATION
Navigator attend probate court hearing. Based upon testimony,  determined the patient to be subject to court order and committed the patient to the [facility] Board for a period of 90 days.    A discussion was held regarding the Motion for Forced Medication. After discussion with patient and all parties, the patient consented to completing a CT Scan prior to the Forced Medication hearing. The hearing has been continued until the results of the CT Scan are reviewed.    Navigator met with patient after the hearing and discussed the court findings. Patient appeared visibly distressed, tears in eyes, he reports he does not understand why all of this matters since he is agreeing not to buy a gun. Patient  preoccupied with no one being afraid of him until he tried to buy a gun. He reports that he still believes that his phone and car Navigation are hacked by someone, and that he was able to get them out of his computer - though reports he reports that he has an appointment with [employer] for them to take all the pieces out of his phone and replace them. Patient reports that his neighbor can have the apartment if it would stop these processes.     Navigator explained the outcome of the court hearing and the AOT/Commitment/Requirements/Expectations. Explained court ordered mental health treatment, case management, and medication management. Patient appeared shocked but was not voicing disagreement.    [facility] [employer] told him that they were waiting on his background check. Patient became suspicious in thinking that the information relayed to the police was heresay. Navigator did inform patient that this writer spoke with [facility] Police directly and confirmed the calls/reports.    Patient did express worry about how this will impact his job and is worried about how his father will react. Navigator offered therapeutic listening and offered

## 2024-10-04 NOTE — CARE COORDINATION
Pt requested to speak with SW.    DURGA met with pt who stated that he needs to get on his phone to pay his bills. Pt then discussed that his phone was being hacked and this was confirmed with [employer] and he is trying to get the record sent to him but they need a confirmation number and it has to be sent to his phone or his mom's phone, however his mom is unable to read to code so he needs it to be sent to him. DURGA informed pt that this can be discussed with management.     DURGA updated NP and SW supervisor.

## 2024-10-04 NOTE — CARE COORDINATION
SW met with pt during treatment team. Pt was seen in his room and NP discussed the hearing. Pt stated that the courts found him to be \"psychotic and delusional\" and he is now \"subject to court order in this jurisdiction for 90 days.\" Pt stated that he is now forced to be on medications and \"we are driving him crazy\" Pt then asked the team to \"get the fuck out\" pf his room.

## 2024-10-05 PROCEDURE — 6370000000 HC RX 637 (ALT 250 FOR IP): Performed by: NURSE PRACTITIONER

## 2024-10-05 PROCEDURE — 6370000000 HC RX 637 (ALT 250 FOR IP): Performed by: PSYCHIATRY & NEUROLOGY

## 2024-10-05 PROCEDURE — 94660 CPAP INITIATION&MGMT: CPT

## 2024-10-05 PROCEDURE — 1240000000 HC EMOTIONAL WELLNESS R&B

## 2024-10-05 RX ADMIN — GUAIFENESIN 400 MG: 400 TABLET ORAL at 15:31

## 2024-10-05 RX ADMIN — GUAIFENESIN 400 MG: 400 TABLET ORAL at 21:22

## 2024-10-05 RX ADMIN — ALUMINUM HYDROXIDE, MAGNESIUM HYDROXIDE, AND SIMETHICONE 30 ML: 200; 200; 20 SUSPENSION ORAL at 22:54

## 2024-10-05 RX ADMIN — GUAIFENESIN 400 MG: 400 TABLET ORAL at 10:00

## 2024-10-06 PROCEDURE — 1240000000 HC EMOTIONAL WELLNESS R&B

## 2024-10-06 PROCEDURE — 6370000000 HC RX 637 (ALT 250 FOR IP): Performed by: PSYCHIATRY & NEUROLOGY

## 2024-10-06 PROCEDURE — 94660 CPAP INITIATION&MGMT: CPT

## 2024-10-06 PROCEDURE — 6370000000 HC RX 637 (ALT 250 FOR IP): Performed by: NURSE PRACTITIONER

## 2024-10-06 RX ORDER — DICYCLOMINE HYDROCHLORIDE 10 MG/1
10 CAPSULE ORAL 3 TIMES DAILY PRN
Status: DISCONTINUED | OUTPATIENT
Start: 2024-10-06 | End: 2024-10-14 | Stop reason: HOSPADM

## 2024-10-06 RX ADMIN — DICYCLOMINE HYDROCHLORIDE 10 MG: 10 CAPSULE ORAL at 12:20

## 2024-10-06 RX ADMIN — BENZOCAINE AND MENTHOL 1 LOZENGE: 15; 3.6 LOZENGE ORAL at 22:23

## 2024-10-06 RX ADMIN — GUAIFENESIN 400 MG: 400 TABLET ORAL at 22:22

## 2024-10-06 RX ADMIN — ACETAMINOPHEN 650 MG: 325 TABLET ORAL at 22:22

## 2024-10-06 RX ADMIN — GUAIFENESIN 400 MG: 400 TABLET ORAL at 12:20

## 2024-10-06 RX ADMIN — ACETAMINOPHEN 650 MG: 325 TABLET ORAL at 12:20

## 2024-10-06 RX ADMIN — DICYCLOMINE HYDROCHLORIDE 10 MG: 10 CAPSULE ORAL at 22:22

## 2024-10-06 ASSESSMENT — PAIN SCALES - GENERAL: PAINLEVEL_OUTOF10: 0

## 2024-10-06 ASSESSMENT — PAIN DESCRIPTION - LOCATION
LOCATION: HEAD
LOCATION: KNEE

## 2024-10-06 ASSESSMENT — PAIN - FUNCTIONAL ASSESSMENT: PAIN_FUNCTIONAL_ASSESSMENT: ACTIVITIES ARE NOT PREVENTED

## 2024-10-07 PROCEDURE — 1240000000 HC EMOTIONAL WELLNESS R&B

## 2024-10-07 PROCEDURE — 6370000000 HC RX 637 (ALT 250 FOR IP): Performed by: NURSE PRACTITIONER

## 2024-10-07 PROCEDURE — 94660 CPAP INITIATION&MGMT: CPT

## 2024-10-07 PROCEDURE — 6370000000 HC RX 637 (ALT 250 FOR IP): Performed by: PSYCHIATRY & NEUROLOGY

## 2024-10-07 RX ADMIN — BENZOCAINE AND MENTHOL 1 LOZENGE: 15; 3.6 LOZENGE ORAL at 13:50

## 2024-10-07 RX ADMIN — GUAIFENESIN 400 MG: 400 TABLET ORAL at 14:31

## 2024-10-07 RX ADMIN — BENZOCAINE AND MENTHOL 1 LOZENGE: 15; 3.6 LOZENGE ORAL at 20:34

## 2024-10-07 RX ADMIN — ACETAMINOPHEN 650 MG: 325 TABLET ORAL at 20:34

## 2024-10-07 RX ADMIN — GUAIFENESIN 400 MG: 400 TABLET ORAL at 09:13

## 2024-10-07 RX ADMIN — DICYCLOMINE HYDROCHLORIDE 10 MG: 10 CAPSULE ORAL at 04:44

## 2024-10-07 RX ADMIN — GUAIFENESIN 400 MG: 400 TABLET ORAL at 20:34

## 2024-10-07 RX ADMIN — ACETAMINOPHEN 650 MG: 325 TABLET ORAL at 04:44

## 2024-10-07 RX ADMIN — BENZOCAINE AND MENTHOL 1 LOZENGE: 15; 3.6 LOZENGE ORAL at 22:33

## 2024-10-07 ASSESSMENT — PAIN DESCRIPTION - LOCATION
LOCATION: KNEE
LOCATION: THROAT

## 2024-10-07 ASSESSMENT — PAIN SCALES - GENERAL
PAINLEVEL_OUTOF10: 0
PAINLEVEL_OUTOF10: 1
PAINLEVEL_OUTOF10: 0
PAINLEVEL_OUTOF10: 3

## 2024-10-07 ASSESSMENT — PAIN DESCRIPTION - ORIENTATION
ORIENTATION: MID
ORIENTATION: LEFT

## 2024-10-07 ASSESSMENT — PAIN - FUNCTIONAL ASSESSMENT: PAIN_FUNCTIONAL_ASSESSMENT: ACTIVITIES ARE NOT PREVENTED

## 2024-10-07 ASSESSMENT — PAIN DESCRIPTION - DESCRIPTORS: DESCRIPTORS: ACHING;DISCOMFORT;SORE

## 2024-10-07 NOTE — CARE COORDINATION
DURGA met with pt in treatment team. Pt was seen in his room. Pt stated that he is feeling tired today. NP discussed the CT scan with the pt and pt is refusing to get this. Pt stated that he wants to hold off and wait to get this completed. Pt stated that he has had sinus problems his whole life and he had CT and X-rays done in the past and he doesn't want to get anymore radiation. Pt stated that radiation leads to cancer. Pt stated that he had a CT scan around 1 year ago and he said he would not get anymore after this. Pt stated that he is sleeping fine. Pt denied wanting to go to any groups. Pt stated that he got his credit card figured out.

## 2024-10-07 NOTE — CARE COORDINATION
[facility]  [facility], patient refused Head CT on Friday. Per [place], per the motion when the hospital is ready to move forward on forced meds, the court will require a phone call from  Lino.    Navigator updated Dr. MONICA Roe & SONAL Valerio.    Electronically signed by KATJA Villanueva, RYANN on 10/7/2024 at 10:18 AM

## 2024-10-08 ENCOUNTER — APPOINTMENT (OUTPATIENT)
Dept: CT IMAGING | Age: 64
DRG: 885 | End: 2024-10-08
Attending: PSYCHIATRY & NEUROLOGY
Payer: COMMERCIAL

## 2024-10-08 PROCEDURE — 70450 CT HEAD/BRAIN W/O DYE: CPT

## 2024-10-08 PROCEDURE — 6370000000 HC RX 637 (ALT 250 FOR IP)

## 2024-10-08 PROCEDURE — 6370000000 HC RX 637 (ALT 250 FOR IP): Performed by: NURSE PRACTITIONER

## 2024-10-08 PROCEDURE — 1240000000 HC EMOTIONAL WELLNESS R&B

## 2024-10-08 PROCEDURE — 2500000003 HC RX 250 WO HCPCS: Performed by: NURSE PRACTITIONER

## 2024-10-08 PROCEDURE — 94660 CPAP INITIATION&MGMT: CPT

## 2024-10-08 RX ORDER — GUAIFENESIN 200 MG/10ML
400 LIQUID ORAL 3 TIMES DAILY
Status: DISCONTINUED | OUTPATIENT
Start: 2024-10-08 | End: 2024-10-14 | Stop reason: HOSPADM

## 2024-10-08 RX ORDER — CETIRIZINE HYDROCHLORIDE 10 MG/1
10 TABLET ORAL DAILY
Status: DISCONTINUED | OUTPATIENT
Start: 2024-10-08 | End: 2024-10-14 | Stop reason: HOSPADM

## 2024-10-08 RX ADMIN — DICYCLOMINE HYDROCHLORIDE 10 MG: 10 CAPSULE ORAL at 09:17

## 2024-10-08 RX ADMIN — DIVALPROEX SODIUM 250 MG: 250 TABLET, DELAYED RELEASE ORAL at 17:55

## 2024-10-08 RX ADMIN — GUAIFENESIN 400 MG: 100 LIQUID ORAL at 21:35

## 2024-10-08 RX ADMIN — DICYCLOMINE HYDROCHLORIDE 10 MG: 10 CAPSULE ORAL at 18:49

## 2024-10-08 RX ADMIN — GUAIFENESIN 400 MG: 100 LIQUID ORAL at 13:41

## 2024-10-08 RX ADMIN — GUAIFENESIN 400 MG: 400 TABLET ORAL at 09:17

## 2024-10-08 RX ADMIN — RISPERIDONE 1 MG: 1 TABLET, FILM COATED ORAL at 18:00

## 2024-10-08 RX ADMIN — BENZOCAINE AND MENTHOL 1 LOZENGE: 15; 3.6 LOZENGE ORAL at 09:17

## 2024-10-08 RX ADMIN — CETIRIZINE HYDROCHLORIDE 10 MG: 10 TABLET, FILM COATED ORAL at 12:26

## 2024-10-08 ASSESSMENT — PAIN DESCRIPTION - LOCATION: LOCATION: ABDOMEN

## 2024-10-08 ASSESSMENT — PAIN SCALES - GENERAL
PAINLEVEL_OUTOF10: 0
PAINLEVEL_OUTOF10: 0
PAINLEVEL_OUTOF10: 3

## 2024-10-08 ASSESSMENT — PAIN DESCRIPTION - DESCRIPTORS: DESCRIPTORS: CRAMPING;DISCOMFORT

## 2024-10-08 NOTE — BH NOTE
Patient is resting with eyes closed. Respirations are even and unlabored, no signs of distress. 15 minute rounds continued.

## 2024-10-08 NOTE — BH NOTE
Patient took Risperdal 1 mg and Depakote 250 mg at this time. Pt  was present as well as this staff member, RN and .

## 2024-10-08 NOTE — BH NOTE
Patient denies suicidal/homicidal ideation and hallucinations. Patient refused scheduled medications other than guaifenesin, stating that he will not be taking those medications unless we strap him down and force them in him. Patient did take Cepacol and Tylenol for his sore throat. Patient states that he is tired and needs to get more rest

## 2024-10-08 NOTE — GROUP NOTE
Group Therapy Note    Date: 10/8/2024    Group Start Time: 1430  Group End Time: 1515  Group Topic: Recreational    Marta Wing CTRS    Group Therapy Note    Attendees: 14    Date: 10/8/2024  Start Time: 1430  End Time:  1515  Number of Participants: 14    Type of Group: Recreational    Name:  [employer] [facility]    Patient's Goal:  Increased social skills by working with peers to generate items that fit into categories and following [employer] game rules.    Notes:  Patient was actively engaged in group activity.    Status After Intervention:  Improved    Participation Level: Active Listener and Interactive    Participation Quality: Appropriate, Attentive, and Sharing      Speech:  normal      Thought Process/Content: Logical  Linear      Affective Functioning: Congruent      Mood:  Appropriate      Level of consciousness:  Alert and Attentive      Response to Learning: Progressing to goal      Endings: None Reported    Modes of Intervention: Socialization, Problem-solving, and Activity      Discipline Responsible: Psychoeducational Specialist      Signature:  CRISTEL Montalvo

## 2024-10-09 PROCEDURE — 6370000000 HC RX 637 (ALT 250 FOR IP)

## 2024-10-09 PROCEDURE — 6370000000 HC RX 637 (ALT 250 FOR IP): Performed by: NURSE PRACTITIONER

## 2024-10-09 PROCEDURE — 1240000000 HC EMOTIONAL WELLNESS R&B

## 2024-10-09 PROCEDURE — 94660 CPAP INITIATION&MGMT: CPT

## 2024-10-09 PROCEDURE — 2500000003 HC RX 250 WO HCPCS: Performed by: NURSE PRACTITIONER

## 2024-10-09 RX ORDER — DIVALPROEX SODIUM 250 MG/1
250 TABLET, DELAYED RELEASE ORAL EVERY 12 HOURS SCHEDULED
Status: DISCONTINUED | OUTPATIENT
Start: 2024-10-10 | End: 2024-10-11

## 2024-10-09 RX ORDER — RISPERIDONE 1 MG/1
1.5 TABLET ORAL NIGHTLY
Status: DISCONTINUED | OUTPATIENT
Start: 2024-10-09 | End: 2024-10-11

## 2024-10-09 RX ADMIN — GUAIFENESIN 400 MG: 100 LIQUID ORAL at 21:19

## 2024-10-09 RX ADMIN — GUAIFENESIN 400 MG: 100 LIQUID ORAL at 10:07

## 2024-10-09 RX ADMIN — GUAIFENESIN 400 MG: 100 LIQUID ORAL at 14:03

## 2024-10-09 RX ADMIN — BENZOCAINE AND MENTHOL 1 LOZENGE: 15; 3.6 LOZENGE ORAL at 18:53

## 2024-10-09 RX ADMIN — DIVALPROEX SODIUM 250 MG: 250 TABLET, DELAYED RELEASE ORAL at 06:35

## 2024-10-09 RX ADMIN — CETIRIZINE HYDROCHLORIDE 10 MG: 10 TABLET, FILM COATED ORAL at 09:04

## 2024-10-09 RX ADMIN — DICYCLOMINE HYDROCHLORIDE 10 MG: 10 CAPSULE ORAL at 07:34

## 2024-10-09 RX ADMIN — DIVALPROEX SODIUM 250 MG: 250 TABLET, DELAYED RELEASE ORAL at 14:03

## 2024-10-09 RX ADMIN — RISPERIDONE 1.5 MG: 1 TABLET, FILM COATED ORAL at 21:22

## 2024-10-09 ASSESSMENT — PAIN SCALES - GENERAL
PAINLEVEL_OUTOF10: 0
PAINLEVEL_OUTOF10: 0

## 2024-10-09 NOTE — GROUP NOTE
Shared goal for the day as to resume attending groups.                                                                       Group Therapy Note    Date: 10/9/2024    Group Start Time: 0930  Group End Time: 0955  Group Topic: Psychoeducation    [facility] 1    Sheela Novoa CTRS     Type of Group: Community Meeting      Patient's Goal:  Patient will be able to id staffing assignments, expectations of patients, and general information re: floor rules. Will be prompted to share goal for the day.     Notes:  Patient appeared to be an active listener, taking in information presented and was prompted to share goal for the day.    Status After Intervention:  Improved    Participation Level: Active Listener and Interactive    Participation Quality: Appropriate, Attentive, and Sharing      Speech:  normal      Thought Process/Content: Logical      Affective Functioning: Congruent      Mood: euthymic      Level of consciousness:  Alert, Oriented x4, and Attentive      Response to Learning: Able to verbalize/acknowledge new learning, Able to retain information, and Progressing to goal      Endings: None Reported    Modes of Intervention: Education, Support, and Clarifying      Discipline Responsible: Psychoeducational Specialist      Signature:  CRISTEL Franz

## 2024-10-09 NOTE — GROUP NOTE
Group Therapy Note    Date: 10/9/2024    Group Start Time: 1530  Group End Time: 1555  Group Topic: Guest Group    [facility] 1    Sheela Novoa, CTRS    Date: 10/9/2024  Type of Group: Psychoeducation    Wellness Binder Information  Module Name:  Pet Therapy     Patient's Goal:  Pt will be able to participate in pet therapy, petting, asking questions about dogs and therapy animals.     Notes:  Pleasant and actively willing to pet dog on unit.     Status After Intervention:  Improved    Participation Level: Active Listener and Interactive    Participation Quality: Appropriate, Attentive, and Sharing      Speech:  normal      Thought Process/Content: Logical      Affective Functioning: Congruent      Mood: euthymic      Level of consciousness:  Alert, Oriented x4, and Attentive      Response to Learning: Able to verbalize/acknowledge new learning, Able to retain information, and Progressing to goal      Endings: None Reported    Modes of Intervention: Support, Socialization, and Activity      Discipline Responsible: Psychoeducational Specialist      Signature:  Sheela Novoa, CTRS

## 2024-10-09 NOTE — CARE COORDINATION
SW met with pt in treatment team. Pt stated that he is feeling tired today and he believes that it is because of the medications. Pt stated that his thoughts have been fine. Pt reported that his dad came to visit him yesterday and they discussed how to get his mail as he is currently in the hospital. Pt stated that he has not heard anything about his eviction. Pt is planning on returning back to his apartment and stated that he never felt unsafe with his neighbor. Pt stated that we are putting words into his mouth and him trying to purchase a gun had nothing to do with his safety. Pt stated that he was aggravated and he was trying to get the gun to wear as a prop.

## 2024-10-09 NOTE — CARE COORDINATION
Navigator notified  [place] office of [place] to put patient back on docket for forced medication hearing on Friday - Spoke with treatment team and hospital will consider withdrawing if patient continues to consistently take his medication. Navigator notified [facility] Board, Alicja Redd.    Electronically signed by KATJA Villanueva, RYANN on 10/9/2024 at 2:08 PM

## 2024-10-09 NOTE — GROUP NOTE
Group Therapy Note    Date: 10/9/2024    Group Start Time: 1050  Group End Time: 1135  Group Topic: Psychotherapy    [facility] 1    Madison Stephens MSW, LSW        Group Therapy Note    Attendees: 6       Patient's Goal:  To increase social interaction and improve relationships with others.      Notes:  Pt was attentive in group and was able to identify an agenda. They were also able to verbalize relating to others within the group.      Status After Intervention:  Improved    Participation Level: Active Listener and Interactive    Participation Quality: Appropriate, Attentive, Sharing, and Supportive      Speech:  normal      Thought Process/Content: Logical  Linear      Affective Functioning: Congruent      Mood: anxious      Level of consciousness:  Alert, Oriented x4, and Attentive      Response to Learning: Able to verbalize current knowledge/experience, Able to verbalize/acknowledge new learning, Able to retain information, and Capable of insight      Endings: None Reported    Modes of Intervention: Support, Socialization, and Exploration      Discipline Responsible: /Counselor      Signature:  KATJA Perales LSW

## 2024-10-10 LAB
CHOLEST SERPL-MCNC: 153 MG/DL
HBA1C MFR BLD: 5.6 % (ref 4–5.6)
HDLC SERPL-MCNC: 47 MG/DL
LDLC SERPL CALC-MCNC: 89 MG/DL
TRIGL SERPL-MCNC: 85 MG/DL
VLDLC SERPL CALC-MCNC: 17 MG/DL

## 2024-10-10 PROCEDURE — 80061 LIPID PANEL: CPT

## 2024-10-10 PROCEDURE — 6370000000 HC RX 637 (ALT 250 FOR IP): Performed by: NURSE PRACTITIONER

## 2024-10-10 PROCEDURE — 1240000000 HC EMOTIONAL WELLNESS R&B

## 2024-10-10 PROCEDURE — 36415 COLL VENOUS BLD VENIPUNCTURE: CPT

## 2024-10-10 PROCEDURE — 94660 CPAP INITIATION&MGMT: CPT

## 2024-10-10 PROCEDURE — 83036 HEMOGLOBIN GLYCOSYLATED A1C: CPT

## 2024-10-10 PROCEDURE — 2500000003 HC RX 250 WO HCPCS: Performed by: NURSE PRACTITIONER

## 2024-10-10 RX ADMIN — GUAIFENESIN 400 MG: 100 LIQUID ORAL at 09:08

## 2024-10-10 RX ADMIN — DIVALPROEX SODIUM 250 MG: 250 TABLET, DELAYED RELEASE ORAL at 20:25

## 2024-10-10 RX ADMIN — CETIRIZINE HYDROCHLORIDE 10 MG: 10 TABLET, FILM COATED ORAL at 09:08

## 2024-10-10 RX ADMIN — GUAIFENESIN 400 MG: 100 LIQUID ORAL at 20:57

## 2024-10-10 RX ADMIN — DICYCLOMINE HYDROCHLORIDE 10 MG: 10 CAPSULE ORAL at 15:36

## 2024-10-10 RX ADMIN — RISPERIDONE 1.5 MG: 1 TABLET, FILM COATED ORAL at 20:25

## 2024-10-10 RX ADMIN — DIVALPROEX SODIUM 250 MG: 250 TABLET, DELAYED RELEASE ORAL at 09:09

## 2024-10-10 RX ADMIN — BENZOCAINE AND MENTHOL 1 LOZENGE: 15; 3.6 LOZENGE ORAL at 21:14

## 2024-10-10 RX ADMIN — GUAIFENESIN 400 MG: 100 LIQUID ORAL at 15:31

## 2024-10-10 ASSESSMENT — PAIN SCALES - GENERAL
PAINLEVEL_OUTOF10: 0
PAINLEVEL_OUTOF10: 0

## 2024-10-10 NOTE — GROUP NOTE
Group Therapy Note    Date: 10/10/2024    Group Start Time: 0930  Group End Time: 0945  Group Topic: Community Meeting    Marta Wing CTRS    Group Therapy Note    Attendees: 14    Date: 10/10/2024  Start Time: 0930  End Time:  0945  Number of Participants: 14    Type of Group: Community Meeting    Patient's Goal:  Increased awareness of functions of the unit, programming and staffing. Identified goal for the day.    Notes:  Patient was an active listener in group. Patient identified goal for the day as, \"Keep attending groups.\"    Status After Intervention:  Improved    Participation Level: Active Listener and Interactive    Participation Quality: Appropriate, Attentive, and Sharing      Speech:  normal      Thought Process/Content: Logical  Linear      Affective Functioning: Congruent      Mood:  Appropriate      Level of consciousness:  Alert and Attentive      Response to Learning: Able to verbalize current knowledge/experience, Able to verbalize/acknowledge new learning, Able to retain information, Capable of insight, Able to change behavior, and Progressing to goal      Endings: None Reported    Modes of Intervention: Education, Support, Socialization, Exploration, Clarifying, and Problem-solving      Discipline Responsible: Psychoeducational Specialist      Signature:  CRISTEL Montalvo

## 2024-10-10 NOTE — GROUP NOTE
Group Therapy Note    Date: 10/10/2024    Group Start Time: 1050  Group End Time: 1140  Group Topic: Psychotherapy    [facility] 1    Becka Purdy MSW, LSW        Group Therapy Note    Attendees: 11       Patient's Goal:  To increase social interaction and improve relationships with others.      Notes:  Pt was attentive in group and was able to identify an agenda. They were also able to verbalize relating to others within the group.     Status After Intervention:  Improved    Participation Level: Active Listener and Interactive    Participation Quality: Appropriate, Attentive, Sharing, and Supportive      Speech:  normal      Thought Process/Content: Logical      Affective Functioning: Congruent      Mood: euthymic      Level of consciousness:  Alert, Oriented x4, and Attentive      Response to Learning: Able to verbalize current knowledge/experience, Able to verbalize/acknowledge new learning, and Able to retain information      Endings: None Reported    Modes of Intervention: Education, Support, Socialization, Exploration, Clarifying, and Problem-solving      Discipline Responsible: /Counselor      Signature:  KATJA Weeks LSW

## 2024-10-11 PROCEDURE — 94660 CPAP INITIATION&MGMT: CPT

## 2024-10-11 PROCEDURE — 1240000000 HC EMOTIONAL WELLNESS R&B

## 2024-10-11 PROCEDURE — 6370000000 HC RX 637 (ALT 250 FOR IP): Performed by: NURSE PRACTITIONER

## 2024-10-11 PROCEDURE — 2500000003 HC RX 250 WO HCPCS: Performed by: NURSE PRACTITIONER

## 2024-10-11 RX ORDER — DIVALPROEX SODIUM 500 MG/1
500 TABLET, FILM COATED, EXTENDED RELEASE ORAL
Status: DISCONTINUED | OUTPATIENT
Start: 2024-10-11 | End: 2024-10-14 | Stop reason: HOSPADM

## 2024-10-11 RX ORDER — RISPERIDONE 2 MG/1
2 TABLET ORAL NIGHTLY
Status: COMPLETED | OUTPATIENT
Start: 2024-10-11 | End: 2024-10-11

## 2024-10-11 RX ORDER — RISPERIDONE 2 MG/1
2 TABLET ORAL NIGHTLY
Status: DISCONTINUED | OUTPATIENT
Start: 2024-10-11 | End: 2024-10-11

## 2024-10-11 RX ADMIN — CETIRIZINE HYDROCHLORIDE 10 MG: 10 TABLET, FILM COATED ORAL at 08:43

## 2024-10-11 RX ADMIN — BENZOCAINE AND MENTHOL 1 LOZENGE: 15; 3.6 LOZENGE ORAL at 19:21

## 2024-10-11 RX ADMIN — GUAIFENESIN 400 MG: 100 LIQUID ORAL at 20:59

## 2024-10-11 RX ADMIN — DICYCLOMINE HYDROCHLORIDE 10 MG: 10 CAPSULE ORAL at 02:25

## 2024-10-11 RX ADMIN — DIVALPROEX SODIUM 250 MG: 250 TABLET, DELAYED RELEASE ORAL at 08:43

## 2024-10-11 RX ADMIN — GUAIFENESIN 400 MG: 100 LIQUID ORAL at 08:42

## 2024-10-11 RX ADMIN — GUAIFENESIN 400 MG: 100 LIQUID ORAL at 13:23

## 2024-10-11 RX ADMIN — BENZOCAINE AND MENTHOL 1 LOZENGE: 15; 3.6 LOZENGE ORAL at 12:56

## 2024-10-11 RX ADMIN — RISPERIDONE 2 MG: 2 TABLET, FILM COATED ORAL at 20:52

## 2024-10-11 RX ADMIN — DIVALPROEX SODIUM 500 MG: 500 TABLET, EXTENDED RELEASE ORAL at 20:52

## 2024-10-11 ASSESSMENT — PAIN SCALES - GENERAL
PAINLEVEL_OUTOF10: 9
PAINLEVEL_OUTOF10: 0
PAINLEVEL_OUTOF10: 5

## 2024-10-11 ASSESSMENT — PAIN DESCRIPTION - DESCRIPTORS: DESCRIPTORS: CRAMPING;ACHING;DISCOMFORT

## 2024-10-11 ASSESSMENT — PAIN DESCRIPTION - LOCATION
LOCATION: THROAT
LOCATION: ABDOMEN

## 2024-10-11 NOTE — CARE COORDINATION
Navigator sent communication to [facility]  Kelle iGbson and Case Manger Supervisor Kathleen Sears. Due to patient insurance, case management services may not coverage therefore board funding may need to be utilized. Patient options would be [facility] or [facility]. Due to patient not having much of a prior psychiatric history, [facility] is proposed.    Electronically signed by KATJA Villanueva, RYANN on 10/11/2024 at 2:48 PM

## 2024-10-11 NOTE — GROUP NOTE
Group Therapy Note    Date: 10/11/2024    Group Start Time: 0945  Group End Time: 1020  Group Topic: Psychoeducation    Marta Wing CTRS    Group Therapy Note    Attendees: 15    Date: 10/11/2024  Start Time: 0945  End Time:  1020  Number of Participants: 15    Type of Group: Psychoeducation    Name:  Processing Change    Patient's Goal:  Identified stages of change and healthy ways to process change.    Notes:  CTRS led educational group discussion on processing change. Encouraged patients to share their experience. Patient was actively engaged in group discussion and made positive responses.    Status After Intervention:  Improved    Participation Level: Active Listener and Interactive    Participation Quality: Appropriate, Attentive, and Sharing      Speech:  normal      Thought Process/Content: Logical  Linear      Affective Functioning: Congruent      Mood:  Appropriate      Level of consciousness:  Alert and Attentive      Response to Learning: Able to verbalize current knowledge/experience, Able to verbalize/acknowledge new learning, Able to retain information, Capable of insight, Able to change behavior, and Progressing to goal      Endings: None Reported    Modes of Intervention: Education, Support, Socialization, Exploration, Clarifying, and Problem-solving      Discipline Responsible: Psychoeducational Specialist      Signature:  CRISTEL Montalvo

## 2024-10-11 NOTE — GROUP NOTE
Group Therapy Note    Date: 10/11/2024    Group Start Time: 0930  Group End Time: 0945  Group Topic: Community Meeting    Marta Wing CTRS    Group Therapy Note    Attendees: 15    Date: 10/11/2024  Start Time: 0930  End Time:  0945  Number of Participants: 15    Type of Group: Community Meeting    Patient's Goal:  Increased awareness of functions of the unit, programming and staffing. Patients identified goal for the day.    Notes:  Patient was an active listener in group. Patient identified goal for the day as, \"Practicing patience and come to more groups.\"    Status After Intervention:  Improved    Participation Level: Active Listener and Interactive    Participation Quality: Appropriate, Attentive, and Sharing      Speech:  normal      Thought Process/Content: Logical  Linear      Affective Functioning: Congruent      Mood:  Appropriate      Level of consciousness:  Alert and Attentive      Response to Learning: Able to verbalize current knowledge/experience, Able to verbalize/acknowledge new learning, Able to retain information, Capable of insight, Able to change behavior, and Progressing to goal      Endings: None Reported    Modes of Intervention: Education, Support, Socialization, Exploration, Clarifying, and Problem-solving      Discipline Responsible: Psychoeducational Specialist      Signature:  CRISTEL Montalvo

## 2024-10-11 NOTE — GROUP NOTE
Group Therapy Note    Date: 10/11/2024    Group Start Time: 1445  Group End Time: 1600  Group Topic: Recreational    Marta Wing CTRS    Group Therapy Note    Attendees: 11    Date: 10/11/2024  Start Time: 1445  End Time:  1600  Number of Participants: 11    Type of Group: Recreational    Name:  Cerapedics Therapy    Patient's Goal:  Increased mood, decreased loneliness, feelings of isolation through comedy movie.    Notes:  Patient was an active participant in [place] therapy.    Status After Intervention:  Improved    Participation Level: Active Listener and Interactive    Participation Quality: Appropriate and Attentive      Speech:  normal      Thought Process/Content: Logical  Linear      Affective Functioning: Congruent      Mood:  Appropriate      Level of consciousness:  Alert and Attentive      Response to Learning: Progressing to goal      Endings: None Reported    Modes of Intervention: Socialization and Activity      Discipline Responsible: Psychoeducational Specialist      Signature:  CRISTEL Montalvo

## 2024-10-11 NOTE — CARE COORDINATION
Navigator met with patient and discussed the outcome of his probate court hearing. Reviewed AOT Requirements & Expectations. Patient verbalized understanding, though disagrees with the process. Patient did not want to sign the AOT Acknowledgement, [facility] AURELIANO.    Discuss[facility]. Patient does not like this process, however verbalized understanding that he will be evaluated for step-down early next week if he continues to improve.    Patient will be assigned to [facility] for AOT Management.    Updated treatment team.    Electronically signed by KATJA Villanueva, RYANN on 10/11/2024 at 3:37 PM

## 2024-10-11 NOTE — CARE COORDINATION
Navigator attended probate court hearing. Dr. GLORIA Roe consented to the withdraw of the Motion for Forced Medication. Hearing was discontinued.    Patient remains under a commitment to the [facility] Board. Navigator to discuss outpatient providers with patient to determine who will oversee his Assisted Outpatient Treatment order.    Electronically signed by KATJA Villanueva, RYANN on 10/11/2024 at 10:14 AM

## 2024-10-11 NOTE — GROUP NOTE
Group Therapy Note    Date: 10/11/2024    Group Start Time: 1100  Group End Time: 1130  Group Topic: Cognitive Skills    [facility]    Susan Fairbanks MSW, RYANN        Group Therapy Note    Attendees: 15       Patient's Goal: to participate in group discussion on tips for improving self-care.     Notes: pt was an active listener in group.     Status After Intervention:  Improved    Participation Level: Active Listener    Participation Quality: Appropriate and Attentive      Speech:  normal      Thought Process/Content: Logical      Affective Functioning: Congruent      Mood: anxious      Level of consciousness:  Alert and Oriented x4      Response to Learning: Able to verbalize current knowledge/experience, Able to verbalize/acknowledge new learning, and Able to retain information      Endings: None Reported    Modes of Intervention: Education, Support, Socialization, Exploration, Clarifying, and Problem-solving      Discipline Responsible: /Counselor      Signature:  KATJA Myers LSW

## 2024-10-11 NOTE — BH NOTE
Spoke with patient's father, Dr. Lopez Amarjit and his mother over the phone. Parents concerned related to patient's length of stay in the hospital and unclear why he is here. Explained patient's presentation to the emergency department and patient's involuntary hold. Further explained patient's demeanor when seen by the nurse practitioner. Pointed out the patient had refused medications for many days and had a probate hearing last Friday when he was deemed by the court to require mental health treatment thus being court ordered to stay in the hospital for treatment. After that the patient started taking medications and is slowly improving. Indicated to the parents that the plan is for the patient to continue medications as ordered and if improvements continue as being observed at this time that a possible discharge can occur on Monday next week. Assured the parents we would set up discharge outpatient appointments prior to discharge. Assured the parents, their son is safe here and we are taking good care of him. Parents voiced relief and thanked this caller for the information. Direct phone number to the unit provided to the parents for their benefit in calling their son the next time.

## 2024-10-12 PROCEDURE — 6370000000 HC RX 637 (ALT 250 FOR IP): Performed by: PSYCHIATRY & NEUROLOGY

## 2024-10-12 PROCEDURE — 94660 CPAP INITIATION&MGMT: CPT

## 2024-10-12 PROCEDURE — 6370000000 HC RX 637 (ALT 250 FOR IP): Performed by: NURSE PRACTITIONER

## 2024-10-12 PROCEDURE — 2500000003 HC RX 250 WO HCPCS: Performed by: NURSE PRACTITIONER

## 2024-10-12 PROCEDURE — 1240000000 HC EMOTIONAL WELLNESS R&B

## 2024-10-12 RX ORDER — RISPERIDONE 2 MG/1
2 TABLET ORAL NIGHTLY
Status: COMPLETED | OUTPATIENT
Start: 2024-10-12 | End: 2024-10-12

## 2024-10-12 RX ADMIN — GUAIFENESIN 400 MG: 100 LIQUID ORAL at 09:00

## 2024-10-12 RX ADMIN — GUAIFENESIN 400 MG: 100 LIQUID ORAL at 13:52

## 2024-10-12 RX ADMIN — ACETAMINOPHEN 650 MG: 325 TABLET ORAL at 23:02

## 2024-10-12 RX ADMIN — DICYCLOMINE HYDROCHLORIDE 10 MG: 10 CAPSULE ORAL at 09:00

## 2024-10-12 RX ADMIN — GUAIFENESIN 400 MG: 100 LIQUID ORAL at 19:32

## 2024-10-12 RX ADMIN — CETIRIZINE HYDROCHLORIDE 10 MG: 10 TABLET, FILM COATED ORAL at 08:20

## 2024-10-12 RX ADMIN — RISPERIDONE 2 MG: 2 TABLET, FILM COATED ORAL at 21:27

## 2024-10-12 RX ADMIN — DIVALPROEX SODIUM 500 MG: 500 TABLET, EXTENDED RELEASE ORAL at 21:27

## 2024-10-12 RX ADMIN — DICYCLOMINE HYDROCHLORIDE 10 MG: 10 CAPSULE ORAL at 23:04

## 2024-10-12 ASSESSMENT — PAIN - FUNCTIONAL ASSESSMENT: PAIN_FUNCTIONAL_ASSESSMENT: ACTIVITIES ARE NOT PREVENTED

## 2024-10-12 ASSESSMENT — PAIN DESCRIPTION - ORIENTATION: ORIENTATION: OTHER (COMMENT)

## 2024-10-12 ASSESSMENT — PAIN DESCRIPTION - LOCATION
LOCATION: OTHER (COMMENT)
LOCATION: ABDOMEN

## 2024-10-12 ASSESSMENT — PAIN SCALES - GENERAL
PAINLEVEL_OUTOF10: 0
PAINLEVEL_OUTOF10: 2

## 2024-10-12 ASSESSMENT — PAIN DESCRIPTION - DESCRIPTORS: DESCRIPTORS: ACHING;CRAMPING

## 2024-10-12 NOTE — GROUP NOTE
Group Therapy Note    Date: 10/12/2024    Group Start Time: 1405  Group End Time: 1504  Group Topic: Psychoeducation    [facility]    Sheela Novoa, CTRS    Date: 10/12/2024  Module Name:  id daily and life stressors and positive coping to manage    Patient's Goal:  pt will be able to id with others pts current and past stressors. Be willing to share positive coping skills one uses to stay in a positive mindset.     Notes:  pt pleasant and engaged in groups. Able to share when prompted and accepting of handout.     Status After Intervention:  Improved    Participation Level: Active Listener and Interactive    Participation Quality: Appropriate, Attentive, and Sharing      Speech:  normal      Thought Process/Content: Logical      Affective Functioning: Congruent      Mood: euthymic      Level of consciousness:  Alert, Oriented x4, and Attentive      Response to Learning: Able to verbalize/acknowledge new learning, Able to retain information, and Progressing to goal      Endings: None Reported    Modes of Intervention: Education, Support, Socialization, and Problem-solving      Discipline Responsible: Psychoeducational Specialist      Signature:  Sheela Novoa CTRS

## 2024-10-12 NOTE — BH NOTE
Took medications with hesitation, patient appears paranoid/suspicious regarding this nurse referring to his medication by generic name versus trade name.  Patient also required this nurse to return to provide him medication specifically after he ate his breakfast.  Education and 1:1 provided.  Patient did take his medication and received PRN bentyl per physician orders.  See eMAR.

## 2024-10-12 NOTE — GROUP NOTE
Shared goal for the day continue to be compliant with meds and attend groups.                                                                       Group Therapy Note    Date: 10/12/2024    Group Start Time: 0930  Group End Time: 1000  Group Topic: Community Meeting    [facility]    Sheela Novoa, CRISTEL    Type of Group: Community Meeting      Patient's Goal:  Patient will be able to id staffing assignments, expectations of patients, and general information re: floor rules. Will be prompted to share goal for the day.     Notes:  Patient appeared to be an active listener, taking in information presented and was prompted to share goal for the day.    Status After Intervention:  Improved    Participation Level: Active Listener and Interactive    Participation Quality: Appropriate, Attentive, and Sharing      Speech:  normal      Thought Process/Content: Logical      Affective Functioning: Congruent      Mood: euthymic      Level of consciousness:  Alert, Oriented x4, and Attentive      Response to Learning: Able to verbalize/acknowledge new learning, Able to retain information, and Progressing to goal      Endings: None Reported    Modes of Intervention: Support, Socialization, and Clarifying      Discipline Responsible: Psychoeducational Specialist      Signature:  CRISTEL Franz

## 2024-10-13 PROCEDURE — 6370000000 HC RX 637 (ALT 250 FOR IP): Performed by: NURSE PRACTITIONER

## 2024-10-13 PROCEDURE — 94660 CPAP INITIATION&MGMT: CPT

## 2024-10-13 PROCEDURE — 6370000000 HC RX 637 (ALT 250 FOR IP): Performed by: PSYCHIATRY & NEUROLOGY

## 2024-10-13 PROCEDURE — 1240000000 HC EMOTIONAL WELLNESS R&B

## 2024-10-13 PROCEDURE — 2500000003 HC RX 250 WO HCPCS: Performed by: NURSE PRACTITIONER

## 2024-10-13 RX ADMIN — BENZOCAINE AND MENTHOL 1 LOZENGE: 15; 3.6 LOZENGE ORAL at 19:12

## 2024-10-13 RX ADMIN — CETIRIZINE HYDROCHLORIDE 10 MG: 10 TABLET, FILM COATED ORAL at 09:22

## 2024-10-13 RX ADMIN — GUAIFENESIN 400 MG: 100 LIQUID ORAL at 12:54

## 2024-10-13 RX ADMIN — BENZOCAINE AND MENTHOL 1 LOZENGE: 15; 3.6 LOZENGE ORAL at 12:55

## 2024-10-13 RX ADMIN — BENZOCAINE AND MENTHOL 1 LOZENGE: 15; 3.6 LOZENGE ORAL at 21:25

## 2024-10-13 RX ADMIN — DICYCLOMINE HYDROCHLORIDE 10 MG: 10 CAPSULE ORAL at 09:29

## 2024-10-13 RX ADMIN — ACETAMINOPHEN 650 MG: 325 TABLET ORAL at 19:09

## 2024-10-13 RX ADMIN — DIVALPROEX SODIUM 500 MG: 500 TABLET, EXTENDED RELEASE ORAL at 21:25

## 2024-10-13 RX ADMIN — GUAIFENESIN 400 MG: 100 LIQUID ORAL at 09:22

## 2024-10-13 RX ADMIN — GUAIFENESIN 400 MG: 100 LIQUID ORAL at 21:25

## 2024-10-13 RX ADMIN — DICYCLOMINE HYDROCHLORIDE 10 MG: 10 CAPSULE ORAL at 21:25

## 2024-10-13 ASSESSMENT — PAIN DESCRIPTION - LOCATION: LOCATION: KNEE

## 2024-10-13 ASSESSMENT — PAIN SCALES - GENERAL
PAINLEVEL_OUTOF10: 0
PAINLEVEL_OUTOF10: 2
PAINLEVEL_OUTOF10: 3
PAINLEVEL_OUTOF10: 0

## 2024-10-13 ASSESSMENT — PAIN DESCRIPTION - ORIENTATION: ORIENTATION: LEFT

## 2024-10-13 NOTE — GROUP NOTE
Shared goal for the day as to practice mindfulness.                                                                       Group Therapy Note    Date: 10/13/2024    Group Start Time: 0900  Group End Time: 0930  Group Topic: Community Meeting    [facility]    Sheela Novoa, CRISTEL    Type of Group: Community Meeting      Patient's Goal:  Patient will be able to id staffing assignments, expectations of patients, and general information re: floor rules. Will be prompted to share goal for the day.     Notes:  Patient appeared to be an active listener, taking in information presented and was prompted to share goal for the day.    Status After Intervention:  Improved    Participation Level: Active Listener and Interactive    Participation Quality: Appropriate, Attentive, and Sharing      Speech:  normal      Thought Process/Content: Logical      Affective Functioning: Congruent      Mood: euthymic      Level of consciousness:  Alert, Oriented x4, and Attentive      Response to Learning: Able to verbalize/acknowledge new learning, Able to retain information, and Progressing to goal      Endings: None Reported    Modes of Intervention: Education, Support, and Clarifying      Discipline Responsible: Psychoeducational Specialist      Signature:  CRISTEL Franz

## 2024-10-13 NOTE — GROUP NOTE
Group Therapy Note    Date: 10/13/2024    Group Start Time: 1100  Group End Time: 1140  Group Topic: Psychoeducation    [facility]    Sheela Novoa, CTRS    Date: 10/13/2024  Module Name:  patient safety plan     Patient's Goal:  pt will be able to id what his her triggers are, internal coping and outside support network is.     Notes:  pleasant and engaged in group, sharing and willing to complete worksheet.     Status After Intervention:  Improved    Participation Level: Active Listener and Interactive    Participation Quality: Appropriate, Attentive, and Sharing      Speech:  normal      Thought Process/Content: Logical      Affective Functioning: Congruent      Mood: euthymic      Level of consciousness:  Alert, Oriented x4, and Attentive      Response to Learning: Able to verbalize/acknowledge new learning, Able to retain information, and Progressing to goal      Endings: None Reported    Modes of Intervention: Education, Support, Socialization, and Activity      Discipline Responsible: Psychoeducational Specialist      Signature:  Sheela Novoa CTRS

## 2024-10-13 NOTE — GROUP NOTE
Group Therapy Note    Date: 10/13/2024    Group Start Time: 1300  Group End Time: 1500  Group Topic: Recreational    [facility]    Sheela Novoa, CTRS    Date: 10/13/2024  Module Name:  FOOTBALL     Patient's Goal:  pts will be able to interact with others, cheering his or her team on and conversating over plays.     Notes:  Pleasant and engaged in group.     Status After Intervention:  Improved    Participation Level: Active Listener and Interactive    Participation Quality: Appropriate, Attentive, and Sharing      Speech:  normal      Thought Process/Content: Logical      Affective Functioning: Congruent      Mood: euthymic      Level of consciousness:  Alert, Oriented x4, and Attentive      Response to Learning: Able to verbalize/acknowledge new learning, Able to retain information, and Progressing to goal      Endings: None Reported    Modes of Intervention: Education, Support, Socialization, and Media      Discipline Responsible: Psychoeducational Specialist      Signature:  YANE FranzS

## 2024-10-14 VITALS
TEMPERATURE: 97.4 F | HEIGHT: 67 IN | SYSTOLIC BLOOD PRESSURE: 141 MMHG | HEART RATE: 96 BPM | OXYGEN SATURATION: 98 % | WEIGHT: 153.9 LBS | BODY MASS INDEX: 24.16 KG/M2 | DIASTOLIC BLOOD PRESSURE: 84 MMHG | RESPIRATION RATE: 14 BRPM

## 2024-10-14 LAB
DATE LAST DOSE: ABNORMAL
TME LAST DOSE: ABNORMAL H
VALPROATE SERPL-MCNC: 35 UG/ML (ref 50–100)
VANCOMYCIN DOSE: ABNORMAL MG

## 2024-10-14 PROCEDURE — 94660 CPAP INITIATION&MGMT: CPT

## 2024-10-14 PROCEDURE — 2500000003 HC RX 250 WO HCPCS: Performed by: NURSE PRACTITIONER

## 2024-10-14 PROCEDURE — 80164 ASSAY DIPROPYLACETIC ACD TOT: CPT

## 2024-10-14 PROCEDURE — 6370000000 HC RX 637 (ALT 250 FOR IP): Performed by: NURSE PRACTITIONER

## 2024-10-14 PROCEDURE — 36415 COLL VENOUS BLD VENIPUNCTURE: CPT

## 2024-10-14 RX ORDER — CETIRIZINE HYDROCHLORIDE 10 MG/1
10 TABLET ORAL DAILY
Qty: 30 TABLET | Refills: 0 | Status: SHIPPED | OUTPATIENT
Start: 2024-10-14 | End: 2024-11-13

## 2024-10-14 RX ORDER — RISPERIDONE 0.5 MG/1
1 TABLET, ORALLY DISINTEGRATING ORAL DAILY
Status: DISCONTINUED | OUTPATIENT
Start: 2024-10-14 | End: 2024-10-14

## 2024-10-14 RX ORDER — RISPERIDONE 2 MG/1
2 TABLET, ORALLY DISINTEGRATING ORAL NIGHTLY
Qty: 30 TABLET | Refills: 0 | Status: SHIPPED | OUTPATIENT
Start: 2024-10-14 | End: 2024-11-13

## 2024-10-14 RX ORDER — RISPERIDONE 0.5 MG/1
1 TABLET, ORALLY DISINTEGRATING ORAL DAILY
Status: COMPLETED | OUTPATIENT
Start: 2024-10-14 | End: 2024-10-14

## 2024-10-14 RX ORDER — DICYCLOMINE HYDROCHLORIDE 10 MG/1
10 CAPSULE ORAL 3 TIMES DAILY PRN
Qty: 30 CAPSULE | Refills: 0 | Status: SHIPPED | OUTPATIENT
Start: 2024-10-14 | End: 2024-10-28

## 2024-10-14 RX ORDER — DIVALPROEX SODIUM 500 MG/1
500 TABLET, FILM COATED, EXTENDED RELEASE ORAL
Qty: 30 TABLET | Refills: 0 | Status: SHIPPED | OUTPATIENT
Start: 2024-10-14 | End: 2024-11-13

## 2024-10-14 RX ORDER — RISPERIDONE 2 MG/1
2 TABLET, ORALLY DISINTEGRATING ORAL DAILY
Status: DISCONTINUED | OUTPATIENT
Start: 2024-10-14 | End: 2024-10-14

## 2024-10-14 RX ORDER — LANOLIN ALCOHOL/MO/W.PET/CERES
3 CREAM (GRAM) TOPICAL NIGHTLY PRN
COMMUNITY
Start: 2024-10-14 | End: 2024-11-13

## 2024-10-14 RX ADMIN — RISPERIDONE 1 MG: 0.5 TABLET, ORALLY DISINTEGRATING ORAL at 11:48

## 2024-10-14 RX ADMIN — GUAIFENESIN 400 MG: 100 LIQUID ORAL at 09:37

## 2024-10-14 RX ADMIN — CETIRIZINE HYDROCHLORIDE 10 MG: 10 TABLET, FILM COATED ORAL at 09:37

## 2024-10-14 ASSESSMENT — PAIN SCALES - GENERAL: PAINLEVEL_OUTOF10: 0

## 2024-10-14 NOTE — PROGRESS NOTES
Attempted a second try at assessing pt. Pt would not respond d/t continued resting with eyes closed. Respirations even and unlabored. No signs of distress. Purposeful rounding continued.  
BEHAVIORAL HEALTH FOLLOW-UP NOTE     10/1/2024     Patient was seen and examined in person, Chart reviewed   Patient's case discussed with staff/team    Chief Complaint: Encounter for acute psychosis    Interim History:   Patient seen this morning in his room.  He continues to refuse any medications indicating that he is not psychotic.  He continues to have poor insight and judgment regarding circumstances of hospitalization and need for treatment.  He is fixated on wanting for different nasal sprays ordered.  He has been attending groups but is minimally social with peers.  Minimizes circumstance of admission minimizes his attempt of buying a gun.          Appetite: [x] Normal/Unchanged  [] Increased  [] Decreased      Sleep:       [x] Normal/Unchanged  [] Fair       [] Poor              Energy:    [x] Normal/Unchanged  [] Increased  [] Decreased        SI [] Present  [x] Absent    HI  []Present  [x] Absent     Aggression:  [] yes  [x] no    Patient is [x] able  [] unable to CONTRACT FOR SAFETY     PAST MEDICAL/PSYCHIATRIC HISTORY:   Past Medical History:   Diagnosis Date    Fracture of right wrist 1973    GERD (gastroesophageal reflux disease)     Heel spur     Plantar fasciitis     Sinus problem     cysts and polyps       FAMILY/SOCIAL HISTORY:  History reviewed. No pertinent family history.  Social History     Socioeconomic History    Marital status: Single     Spouse name: Not on file    Number of children: Not on file    Years of education: Not on file    Highest education level: Not on file   Occupational History    Not on file   Tobacco Use    Smoking status: Never    Smokeless tobacco: Never   Vaping Use    Vaping status: Never Used   Substance and Sexual Activity    Alcohol use: Not on file    Drug use: No    Sexual activity: Not on file   Other Topics Concern    Not on file   Social History Narrative    Not on file     Social Determinants of Health     Financial Resource Strain: Low Risk  (10/16/2023)    
BEHAVIORAL HEALTH FOLLOW-UP NOTE     10/12/2024     Patient was seen and examined in person, Chart reviewed   Patient's case discussed with staff/team    Chief Complaint: Encounter for acute psychosis    Interim History:   Patient is seen this morning up on the unit.  He has some underlying irritability today.  Although less irritable than on previous assessments.  He states he talked to his  and she states that he does not have to take the long-acting injection as long as he is taking the pills.  I did explain to him that we understand he is taking the pills now here in the hospital but our concern would be his compliance after discharge.  He denies suicidal homicidal ideations intent or plan denies auditory or visual hallucinations.  He is not making delusional statements he does seem more relaxed but does have some increased irritability today.  States he feels less tired he is happy to have the ER Depakote.          Appetite: [x] Normal/Unchanged  [] Increased  [] Decreased      Sleep:       [x] Normal/Unchanged  [] Fair       [] Poor              Energy:    [x] Normal/Unchanged  [] Increased  [] Decreased        SI [] Present  [x] Absent    HI  []Present  [x] Absent     Aggression:  [] yes  [x] no    Patient is [x] able  [] unable to CONTRACT FOR SAFETY     PAST MEDICAL/PSYCHIATRIC HISTORY:   Past Medical History:   Diagnosis Date    Fracture of right wrist 1973    GERD (gastroesophageal reflux disease)     Heel spur     Plantar fasciitis     Sinus problem     cysts and polyps       FAMILY/SOCIAL HISTORY:  History reviewed. No pertinent family history.  Social History     Socioeconomic History    Marital status: Single     Spouse name: Not on file    Number of children: Not on file    Years of education: Not on file    Highest education level: Not on file   Occupational History    Not on file   Tobacco Use    Smoking status: Never    Smokeless tobacco: Never   Vaping Use    Vaping status: Never Used 
BEHAVIORAL HEALTH FOLLOW-UP NOTE     10/13/2024     Patient was seen and examined in person, Chart reviewed   Patient's case discussed with staff/team    Chief Complaint: Encounter for acute psychosis    Interim History:   Patient seen this morning sitting out in the milieu.  He is smiling he is more pleasant he states that he is overall feeling \"better.\"  He recommended some improvement in himself.  He states that he is no longer concerned about his neighbor.  He does not believe that she is going to track him or tried to do anything to him.  We discussed that in the future if he does feel as though things like that are happening to him with his devices or that someone is tracking him that he should go to the police and he acknowledges this.  He understands his plan to go the crisis unit.  He is not receptive long-acting injection at this time as he states he was told by his  that as long as he is taking the pills he does not have to take the injection.  He denies suicidal homicidal ideations intent or plan denies auditory or visual hallucinations.          Appetite: [x] Normal/Unchanged  [] Increased  [] Decreased      Sleep:       [x] Normal/Unchanged  [] Fair       [] Poor              Energy:    [x] Normal/Unchanged  [] Increased  [] Decreased        SI [] Present  [x] Absent    HI  []Present  [x] Absent     Aggression:  [] yes  [x] no    Patient is [x] able  [] unable to CONTRACT FOR SAFETY     PAST MEDICAL/PSYCHIATRIC HISTORY:   Past Medical History:   Diagnosis Date    Fracture of right wrist 1973    GERD (gastroesophageal reflux disease)     Heel spur     Plantar fasciitis     Sinus problem     cysts and polyps       FAMILY/SOCIAL HISTORY:  History reviewed. No pertinent family history.  Social History     Socioeconomic History    Marital status: Single     Spouse name: Not on file    Number of children: Not on file    Years of education: Not on file    Highest education level: Not on file 
BEHAVIORAL HEALTH FOLLOW-UP NOTE     10/3/2024     Patient was seen and examined in person, Chart reviewed   Patient's case discussed with staff/team    Chief Complaint: Encounter for acute psychosis    Interim History:   Patient seen this morning out in the milieu.  I talked at length with patient.  He gives me a list of multiple medications that he is requesting although he is unable to tell me if any of them are active medications that he was prescribed outpatient.  He is hyperfocused on his Armodafinil.  I asked patient if he has hurting him more about his phone and he states is disabled.  I ask if he is hurt anymore about any of his other devices and he states no but his father told him he has to eviction notices on his door.  I asked patient what he plans to do when he leaves here and he states \"not a thing.\"  He states that he does not have to leave until he goes to the court and that could take a long time.  He states that he plans to fight at I ask him if he knows why he may have been affected and he states that he believes it has something to do with a woman below him who he states works for the apartment \"company\" part time.  I ask him how he knows that she works for the apartment company and he states \"they told me so\" he states that he was told that these people who do this type of harassment that they are trying to force you out of your apartment.  He also states that he was told by a  that \"no one hacks you unless it is to try to get money.\"  He then starts talking about the sounds and he is demonstrating by pointing down to the ground on the walls of how the sound is coming up through the walls and about the thin walls and about the water and heating system.  I asked patient if he could just be hearing normal sounds as he stated this walls are thin and if this is just him hearing other tenants.  He states no because he bought a hearing aid in order to pinpoint the direct location of 
BEHAVIORAL HEALTH FOLLOW-UP NOTE     10/4/2024     Patient was seen and examined in person, Chart reviewed   Patient's case discussed with staff/team    Chief Complaint: Encounter for acute psychosis    Interim History:   Patient seen this morning up on the unit.  Earlier today patient had a hearing with the probate court for his thoughts of the court order.  When I asked patient how he is doing he becomes agitated he is talking in a very liao dramatic tone his teeth are graded and his eyes are very wide almost bulging.  He states \"how you think I am doing.\"  He states that he has been told by the court that he is psychotic and delusional and had been found started to court order for 90 days.  He states they also want to \"hold me down and beat me to shit and forcibly drugged me with psychotropic medications.\"  He is misperceiving he is easily agitated he was heard out in the milieu talking about a background check I asked patient who he was talking to about a background check he refused to talk with me in front of students due to his paranoia he tells me that \"you betrayed me.\"  He states that you are the \"conduit to the doctor.\"  He states the \"doctor told lies about me that I was lying on the floor like an animal and that I was agitated and that she was unable to speak with me due to my agitation.\"  Patient is no insight and judgment he remains irritable easily agitated no insight or judgment regarding circumstance of hospitalization and need for treatment          Appetite: [x] Normal/Unchanged  [] Increased  [] Decreased      Sleep:       [x] Normal/Unchanged  [] Fair       [] Poor              Energy:    [x] Normal/Unchanged  [] Increased  [] Decreased        SI [] Present  [x] Absent    HI  []Present  [x] Absent     Aggression:  [] yes  [x] no    Patient is [x] able  [] unable to CONTRACT FOR SAFETY     PAST MEDICAL/PSYCHIATRIC HISTORY:   Past Medical History:   Diagnosis Date    Fracture of right wrist 1973 
BEHAVIORAL HEALTH FOLLOW-UP NOTE     10/6/2024     Patient was seen and examined in person, Chart reviewed   Patient's case discussed with staff/team    Chief Complaint: Encounter for acute psychosis    Interim History:   Patient seen this morning sitting out by the phone.  He is focused on calling the bank to find out if anybody has taken money out of his account.  He states he checked his checking account balance and that seems to be okay but he states when he called the credit card is that he has a $12,000 balance however he states that is higher than his credit was.  I asked patient if this could have been his available credit he is perseverating indicating that he was not able to hear and that staff will let him call back to the be a group going on.  He has poor insight and judgment poor impulse control he seems to misinterpret he is paranoid and guarded.      Appetite: [x] Normal/Unchanged  [] Increased  [] Decreased      Sleep:       [x] Normal/Unchanged  [] Fair       [] Poor              Energy:    [x] Normal/Unchanged  [] Increased  [] Decreased        SI [] Present  [x] Absent    HI  []Present  [x] Absent     Aggression:  [] yes  [x] no    Patient is [x] able  [] unable to CONTRACT FOR SAFETY     PAST MEDICAL/PSYCHIATRIC HISTORY:   Past Medical History:   Diagnosis Date    Fracture of right wrist 1973    GERD (gastroesophageal reflux disease)     Heel spur     Plantar fasciitis     Sinus problem     cysts and polyps       FAMILY/SOCIAL HISTORY:  History reviewed. No pertinent family history.  Social History     Socioeconomic History    Marital status: Single     Spouse name: Not on file    Number of children: Not on file    Years of education: Not on file    Highest education level: Not on file   Occupational History    Not on file   Tobacco Use    Smoking status: Never    Smokeless tobacco: Never   Vaping Use    Vaping status: Never Used   Substance and Sexual Activity    Alcohol use: Not on file    Drug 
BEHAVIORAL HEALTH FOLLOW-UP NOTE     10/7/2024     Patient was seen and examined in person, Chart reviewed   Patient's case discussed with staff/team    Chief Complaint: Encounter for acute psychosis    Interim History:   I saw patient this morning with the treatment team .  Patient to [facility] that he is feeling tired today due to not getting his armodafinil . patient states that he decided to hold off on the CT scan.  When he asked patient why he becomes irritable and uses multiple legal terms such as explaining this ad nauseaum  but uses multiple other terms as well that were not familiar to myself and then reluctantly states that he is had multiple CT scans due to sinus issues and that he has been exposed to too much radiation already and does not want expose himself any further.  He later comes up to the unit and states that he had agreed to the CT scan and court because he believed that it would \"end my false imprisonment.\"  He states that he now has proof from [employer] that his phone has been hacked even since he has been here in the hospital after his phone was disabled so he believes that after he is able to get [employer] to provide him with this proof of the hacking that he will then be discharged based on this proof.    Appetite: [x] Normal/Unchanged  [] Increased  [] Decreased      Sleep:       [x] Normal/Unchanged  [] Fair       [] Poor              Energy:    [x] Normal/Unchanged  [] Increased  [] Decreased        SI [] Present  [x] Absent    HI  []Present  [x] Absent     Aggression:  [] yes  [x] no    Patient is [x] able  [] unable to CONTRACT FOR SAFETY     PAST MEDICAL/PSYCHIATRIC HISTORY:   Past Medical History:   Diagnosis Date    Fracture of right wrist 1973    GERD (gastroesophageal reflux disease)     Heel spur     Plantar fasciitis     Sinus problem     cysts and polyps       FAMILY/SOCIAL HISTORY:  History reviewed. No pertinent family history.  Social History     Socioeconomic History    Marital status: 
BEHAVIORAL HEALTH FOLLOW-UP NOTE     10/9/2024     Patient was seen and examined in person, Chart reviewed   Patient's case discussed with staff/team    Chief Complaint: Encounter for acute psychosis    Interim History:   I saw patient this morning with the treatment team .  Patient states he is feeling tired because he started taking medications.  He states his dad visited yesterday when I asked what they spoke about he states about his mail at his apartment.  He states that he does not believe the mailbox is full because he would receive notice and then he will be taken to the post office.  When I asked patient what the status was eviction or if he plans to return home he states he does plan to return home.  I asked patient if he is going to be safe returning home due to his neighbor and he states that he has never felt unsafe with his neighbor.  When I ask him why then was he attempting to buy a gun he states that I was putting words in his mouth.  He became increasingly agitated and stated that him purchasing a gun was not due to any safety concerns rather he states that he was using the gun to \"put in a holster.\"  And to use it as a \"prop.\"  He was yelling at this time when he said this.  He is impulsive becomes easily agitated poor insight and judgment      Appetite: [x] Normal/Unchanged  [] Increased  [] Decreased      Sleep:       [x] Normal/Unchanged  [] Fair       [] Poor              Energy:    [x] Normal/Unchanged  [] Increased  [] Decreased        SI [] Present  [x] Absent    HI  []Present  [x] Absent     Aggression:  [] yes  [x] no    Patient is [x] able  [] unable to CONTRACT FOR SAFETY     PAST MEDICAL/PSYCHIATRIC HISTORY:   Past Medical History:   Diagnosis Date    Fracture of right wrist 1973    GERD (gastroesophageal reflux disease)     Heel spur     Plantar fasciitis     Sinus problem     cysts and polyps       FAMILY/SOCIAL HISTORY:  History reviewed. No pertinent family history.  Social History 
BEHAVIORAL HEALTH FOLLOW-UP NOTE     9/29/2024     Patient was seen and examined in person, Chart reviewed   Patient's case discussed with staff/team    Chief Complaint: Encounter for acute psychosis    Interim History:   Patient walking around on the unit speech is rapid and pressured, appears to miss perceived throughout assessment he is irritable and easily agitated.  He continues to have very poor insight and judgment does not understand circumstances leading to admission.  He does remain paranoid and suspicious.  Patient has been refusing all psychotropic medications attempt educate patient he states that the only medication he will take is for his insomnia he states that he is been on that for a while and reports that now that he has been off of it when he starts taking it again when he leaves he is going to have very nasty side effects including headaches.  He states that the only medication he needs reports he does not need any other medication from us.  Did attempt to asked patient what medications he would be agreeable to he again states that the only medication.  While speaking with me he continues to clench his teeth, remains very bizarre.  He does denies suicidal ideation, denies homicidal ideation denies auditory and visual hallucinations.  He does become upset when his questions are asked.  He reports that he is sleeping fine here and reports his appetite is okay.    Appetite: [x] Normal/Unchanged  [] Increased  [] Decreased      Sleep:       [x] Normal/Unchanged  [] Fair       [] Poor              Energy:    [x] Normal/Unchanged  [] Increased  [] Decreased        SI [] Present  [x] Absent    HI  []Present  [x] Absent     Aggression:  [] yes  [x] no    Patient is [x] able  [] unable to CONTRACT FOR SAFETY     PAST MEDICAL/PSYCHIATRIC HISTORY:   Past Medical History:   Diagnosis Date    Fracture of right wrist 1973    GERD (gastroesophageal reflux disease)     Heel spur     Plantar fasciitis     Sinus 
BEHAVIORAL HEALTH FOLLOW-UP NOTE     9/30/2024     Patient was seen and examined in person, Chart reviewed   Patient's case discussed with staff/team    Chief Complaint: Encounter for acute psychosis    Interim History:   Patient seen in his room this morning.  He is bizarre delusional and has a difficult time answering questions with relevance.  He states that he was buying a gun in order to exercise his second amendment rights.  He is suspicious of the conversation.  He states that he is not going to take any psychotic medication because he is not psychotic.  He states the police lied and they were racially motivated.  He believes that he was moved to the other unit due to telling the doctor that he has his law degree from [employer].  He states he will not taking the medication prescribed.  He is difficult to interrupt he has significant underlying irritability he is encouraged to come groups he is encouraged to comply with medications.  He asked about his court hearing that he was told that he would have.  He states that he was also told \"the doctor would be male.\"  He is suspicious of this.  He denies suicidal or homicidal ideations intent or plan denies auditory or visual hallucinations he is paranoid suspicious and bizarre    Appetite: [x] Normal/Unchanged  [] Increased  [] Decreased      Sleep:       [x] Normal/Unchanged  [] Fair       [] Poor              Energy:    [x] Normal/Unchanged  [] Increased  [] Decreased        SI [] Present  [x] Absent    HI  []Present  [x] Absent     Aggression:  [] yes  [x] no    Patient is [x] able  [] unable to CONTRACT FOR SAFETY     PAST MEDICAL/PSYCHIATRIC HISTORY:   Past Medical History:   Diagnosis Date    Fracture of right wrist 1973    GERD (gastroesophageal reflux disease)     Heel spur     Plantar fasciitis     Sinus problem     cysts and polyps       FAMILY/SOCIAL HISTORY:  History reviewed. No pertinent family history.  Social History     Socioeconomic History    
[facility]  Week Interdisciplinary Treatment Plan Note     Review Date & Time: 10/11/2024    Patient was in treatment team.    Estimated Length of Stay Update:  2 weeks possible discharge on Monday    Estimated Discharge Date Update: 10/14/2024    EDUCATION:   Learner Progress Toward Treatment Goals: Reviewed results and recommendations of this team, Reviewed group plan and strategies, Reviewed signs, symptoms and risk of self harm and violent behavior, and Reviewed goals and plan of care    Method: Individual    Outcome: Verbalized understanding    PATIENT GOALS: Keep attending groups       PLAN/TREATMENT RECOMMENDATIONS UPDATE: 10/18/2024    GOALS UPDATE:  Time frame for Short-Term Goals: Prior to discharge      Melinda Beatty RN  
[facility]  Discharge Note    Pt discharged with followings belongings:   Dental Appliances: None  Vision - Corrective Lenses: Eyeglasses  Hearing Aid: None  Jewelry: Watch (black smart watch band placed into patient's bag/locker)  Body Piercings Removed: N/A  Clothing: Footwear, Shirt, Shorts, Undergarments  Other Valuables: Other (Comment)   Valuables sent with patient to the crisis unit, along with filled prescriptions, safety plan, contents from unit safe and copy of AVS. All persoanl belongings from locker and room were sent with pt../ or returned to patient. Patient educated on aftercare instructions:  completed.  Information faxed to  crisis unit by  .Patient verbalize understanding of AVS:  yes with stated potential to comply to same.    Status EXAM upon discharge:  Mental Status and Behavioral Exam  Normal: No  Level of Assistance: Independent/Self  Facial Expression: Elevated  Affect: exaggerated  Level of Consciousness: Alert  Frequency of Checks: 4 times per hour, close  Mood: Anxious, Suspicious  Motor Activity:Normal: yes  Eye Contact: Good  Observed Behavior: Preoccupied, Cooperative  Sexual Misconduct History: Current - no  Involved In Any Sexual Misconduct With Others? : No  History of Sexually Inappropriate Behavior When Previously Hospitalized?: No  Uncontrollable/Compulsive Masturbation?: No  Difficulty Controlling Sexual Impulses?: No  Preception: [place] to person, [place] to time, [place] to place, [place] to situation  Attention: Distractible, improved  Thought Processes: Other (comment) (concrete)  Thought Content:Preoccupations  Depression Symptoms: Impaired concentration  Anxiety Symptoms: Generalized  Wanda Symptoms: impaired judgment  Hallucinations: None  Delusions: No  Delusions: Other (comment) (none voiced on a.m. assessment)  Memory:Normal: No  Memory: Other (comment) (impaired)  Insight and Judgment: impaired    Tobacco Screening:  Practical Counseling, 
[facility]  Initial Interdisciplinary Treatment Plan Note      Original treatment plan Date & Time: 09/27/2024 0900    Admission Type:  Admission Type: Involuntary    Reason for admission:   Reason for Admission: \"The police\"    Estimated Length of Stay:  5-7days  Estimated Discharge Date: To be determined by physician.    PATIENT STRENGTHS:  Patient Strengths:   Patient Strengths and Limitations:   Addictive Behavior: Addictive Behavior  In the Past 3 Months, Have You Felt or Has Someone Told You That You Have a Problem With  : None  Medical Problems:  Past Medical History:   Diagnosis Date    Fracture of right wrist 1973    GERD (gastroesophageal reflux disease)     Heel spur     Plantar fasciitis     Sinus problem     cysts and polyps     Status EXAM:Mental Status and Behavioral Exam  Normal: No  Level of Assistance: Independent/Self  Facial Expression: Elevated, Exaggerated  Affect: Inappropriate  Level of Consciousness: Alert  Frequency of Checks: 4 times per hour, close  Mood:Normal: No  Mood: Anxious, Labile, Irritable, Suspicious  Motor Activity:Normal: No  Motor Activity: Increased  Eye Contact: Fair  Observed Behavior: Agitated, Impulsive, Preoccupied  Sexual Misconduct History: Current - no  Preception: [place] to time, [place] to person, [place] to place, [place] to situation  Attention:Normal: No  Attention: Hyperalert  Thought Processes: Circumstantial, Perseveration  Thought Content:Normal: No  Thought Content: Preoccupations, Paranoia, Delusions  Depression Symptoms: Change in energy level, Increased irritability, Sleep disturbance  Anxiety Symptoms: Obsessions,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 3-16 days based on stability    Plan for post-hospital care outpatient psychiatric and counseling services    This patient continues to need, on a daily basis, active treatment furnished directly by or requiring the supervision of inpatient psychiatric personnel.    Electronically signed by SHAUN Woodson CNP on 10/9/2024 at 3:26 PM         NOTE: This report was transcribed using voice recognition software. Every effort was made to ensure accuracy; however, inadvertent computerized transcription errors may be present.     Electronically signed by SHAUN Woodson CNP on 10/5/2024 at 10:19 AM

## 2024-10-14 NOTE — CARE COORDINATION
Submitted level of care change to probate court, [facility], & [facility].    Electronically signed by KATJA Villanueva, LSW on 10/14/2024 at 3:12 PM

## 2024-10-14 NOTE — DISCHARGE SUMMARY
DISCHARGE SUMMARY      Patient ID:  Jose Ocasio  [id number]  63 y.o.  1960    Admit date: 9/26/2024    Discharge date and time: 10/14/2024    Admitting Physician: Pancho Null MD     Discharge Physician: Dr Jyothi ALDRICH    Discharge Diagnoses:   Patient Active Problem List   Diagnosis    Positive SUSIE (antinuclear antibody)    Acute psychosis (HCC)       Admission Condition: poor    Discharged Condition: stable    Admission Circumstance: Patient name: Jose Ocasio  Patient's past mental health and addiction history: Unknown past psychiatric history patient denies any past inpatient psychiatric hospitalizations  Patient's presentation to the ED and why the patient needs admission: Was endorsing paranoia, delusions, auditory hallucinations  Legal status:  []  Voluntary  [x]  Involuntary  []  Probate  Triggering/precipitating events: Patient went to local gun shop to buy a gun for protection as he was endorsing delusions and paranoia including neighbors trying to infiltrate his brain  Duration of triggering/precipitating events: Unknown      PAST MEDICAL/PSYCHIATRIC HISTORY:   Past Medical History:   Diagnosis Date    Fracture of right wrist 1973    GERD (gastroesophageal reflux disease)     Heel spur     Plantar fasciitis     Sinus problem     cysts and polyps       FAMILY/SOCIAL HISTORY:  History reviewed. No pertinent family history.  Social History     Socioeconomic History    Marital status: Single     Spouse name: Not on file    Number of children: Not on file    Years of education: Not on file    Highest education level: Not on file   Occupational History    Not on file   Tobacco Use    Smoking status: Never    Smokeless tobacco: Never   Vaping Use    Vaping status: Never Used   Substance and Sexual Activity    Alcohol use: Not on file    Drug use: No    Sexual activity: Not on file   Other Topics Concern    Not on file   Social History Narrative    Not on file     Social Determinants of Health

## 2024-10-14 NOTE — PLAN OF CARE
Problem: Behavior  Goal: Pt/Family maintain appropriate behavior and adhere to behavioral management agreement, if implemented  Description: INTERVENTIONS:  1. Assess patient/family's coping skills and  non-compliant behavior (including use of illegal substances)  2. Notify security of behavior or suspected illegal substances which indicate the need for search of the family and/or belongings  3. Encourage verbalization of thoughts and concerns in a socially appropriate manner  4. Utilize positive, consistent limit setting strategies supporting safety of patient, staff and others  5. Encourage participation in the decision making process about the behavioral management agreement  6. If a visitor's behavior poses a threat to safety call refer to organization policy.  7. Initiate consult with , Psychosocial CNS, Spiritual Care as appropriate  Outcome: Progressing     Problem: Sleep Disturbance  Goal: Will exhibit normal sleeping pattern  Description: INTERVENTIONS:  1. Administer medication as ordered  2. Decrease environmental stimuli, including noise, as appropriate  3. Discourage social isolation and naps during the day  Outcome: Progressing     Problem: Spiritual Care  Goal: Pt/Family able to move forward in process of forgiving self, others, and/or higher power  Description: INTERVENTIONS:  1. Assist patient/family to overcome blocks to healing by use of spiritual practices (prayer, meditation, guided imagery, reiki, breath work, etc).  2. De-myth guilt and help patient/family identify possible irrational spiritual/cultural beliefs and values.  3. Explore possibilities of making amends & reconciliation with self, others, and/or a greater power.  4. Guide patient/family in identifying painful feelings of guilt.  5. Help patient/famiy explore and identify spiritual beliefs, cultural understandings or values that may help or hinder letting go of issue.  6. Help patient/family explore feelings of anger, 
  Problem: Depression  Goal: Will be euthymic at discharge  Description: INTERVENTIONS:  1. Administer medication as ordered  2. Provide emotional support via 1:1 interaction with staff  3. Encourage involvement in milieu/groups/activities  4. Monitor for social isolation  10/1/2024 0910 by Geri Gannon RN  Outcome: Progressing     Problem: Behavior  Goal: Pt/Family maintain appropriate behavior and adhere to behavioral management agreement, if implemented  Description: INTERVENTIONS:  1. Assess patient/family's coping skills and  non-compliant behavior (including use of illegal substances)  2. Notify security of behavior or suspected illegal substances which indicate the need for search of the family and/or belongings  3. Encourage verbalization of thoughts and concerns in a socially appropriate manner  4. Utilize positive, consistent limit setting strategies supporting safety of patient, staff and others  5. Encourage participation in the decision making process about the behavioral management agreement  6. If a visitor's behavior poses a threat to safety call refer to organization policy.  7. Initiate consult with , Psychosocial CNS, Spiritual Care as appropriate  10/1/2024 0910 by Geri Gannon RN  Outcome: Progressing  Flowsheets (Taken 10/1/2024 0854)  Patient/family maintains appropriate behavior and adheres to behavioral management agreement, if implemented: Utilize positive, consistent limit setting strategies supporting safety of patient, staff and others     Problem: Anxiety  Goal: Will report anxiety at manageable levels  Description: INTERVENTIONS:  1. Administer medication as ordered  2. Teach and rehearse alternative coping skills  3. Provide emotional support with 1:1 interaction with staff  Outcome: Progressing  Flowsheets (Taken 10/1/2024 0854)  Will report anxiety at manageable levels: Provide emotional support with 1:1 interaction with staff    Pt denies suicidal, homicidal 
  Problem: Depression  Goal: Will be euthymic at discharge  Description: INTERVENTIONS:  1. Administer medication as ordered  2. Provide emotional support via 1:1 interaction with staff  3. Encourage involvement in milieu/groups/activities  4. Monitor for social isolation  10/1/2024 2154 by Ulysses Mendoza, RN  Outcome: Progressing     Problem: Psychosis  Goal: Will report no hallucinations or delusions  Description: INTERVENTIONS:  1. Administer medication as  ordered  2. Assist with reality testing to support increasing orientation  3. Assess if patient's hallucinations or delusions are encouraging self harm or harm to others and intervene as appropriate  Outcome: Progressing     Problem: Anxiety  Goal: Will report anxiety at manageable levels  Description: INTERVENTIONS:  1. Administer medication as ordered  2. Teach and rehearse alternative coping skills  3. Provide emotional support with 1:1 interaction with staff  10/1/2024 2154 by Ulysses Mendoza, RN  Outcome: Progressing     Problem: Involuntary Admit  Goal: Will cooperate with staff recommendations and doctor's orders and will demonstrate appropriate behavior  Description: INTERVENTIONS:  1. Treat underlying conditions and offer medication as ordered  2. Educate regarding involuntary admission procedures and rules  3. Contain excessive/inappropriate behavior per unit and hospital policies  Outcome: Not Progressing  Flowsheets (Taken 10/1/2024 2939 by Geri Gannon, RN)  Will cooperate with staff recommendations and doctor's orders and will demonstrate appropriate behavior: Educate regarding involuntary admission procedures and rules     
  Problem: Depression  Goal: Will be euthymic at discharge  Description: INTERVENTIONS:  1. Administer medication as ordered  2. Provide emotional support via 1:1 interaction with staff  3. Encourage involvement in milieu/groups/activities  4. Monitor for social isolation  10/10/2024 1102 by Teri Gomez RN  Reactivated  10/10/2024 1101 by Teri Gomez RN  Outcome: Adequate for Discharge  10/9/2024 2137 by Rohan Mcdonald RN  Outcome: Progressing     Problem: Psychosis  Goal: Will report no hallucinations or delusions  Description: INTERVENTIONS:  1. Administer medication as  ordered  2. Assist with reality testing to support increasing orientation  3. Assess if patient's hallucinations or delusions are encouraging self harm or harm to others and intervene as appropriate    APPLY FOR FORCED MEDICATIONS THROUGH PROBATE COURT:    RISPERDAL 1 MG. TO 2 MG. ORAL DOSE TWICE DAILY    ADMINISTER HALDOL 5 MG. I.M. FOR EACH ORAL DOSE REFUSAL OF RISPERDAL    LONG ACTING INJECTION OF PERSERIS 90 MG.  MG. MG. I.M.      10/10/2024 1102 by Teri Gomez RN  Reactivated  10/10/2024 1101 by Teri Gomez RN  Outcome: Adequate for Discharge  10/9/2024 2137 by Rohan Mcdonald RN  Outcome: Progressing     Problem: Behavior  Goal: Pt/Family maintain appropriate behavior and adhere to behavioral management agreement, if implemented  Description: INTERVENTIONS:  1. Assess patient/family's coping skills and  non-compliant behavior (including use of illegal substances)  2. Notify security of behavior or suspected illegal substances which indicate the need for search of the family and/or belongings  3. Encourage verbalization of thoughts and concerns in a socially appropriate manner  4. Utilize positive, consistent limit setting strategies supporting safety of patient, staff and others  5. Encourage participation in the decision making process about the behavioral management agreement  6. If a visitor's behavior poses a threat to 
  Problem: Depression  Goal: Will be euthymic at discharge  Description: INTERVENTIONS:  1. Administer medication as ordered  2. Provide emotional support via 1:1 interaction with staff  3. Encourage involvement in milieu/groups/activities  4. Monitor for social isolation  10/10/2024 1140 by Teri Gomez RN  Outcome: Progressing  10/10/2024 1102 by Teri Gomez RN  Reactivated  10/10/2024 1101 by Teri Gomez RN  Outcome: Adequate for Discharge     Problem: Psychosis  Goal: Will report no hallucinations or delusions  Description: INTERVENTIONS:  1. Administer medication as  ordered  2. Assist with reality testing to support increasing orientation  3. Assess if patient's hallucinations or delusions are encouraging self harm or harm to others and intervene as appropriate    APPLY FOR FORCED MEDICATIONS THROUGH PROBATE COURT:    RISPERDAL 1 MG. TO 2 MG. ORAL DOSE TWICE DAILY    ADMINISTER HALDOL 5 MG. I.M. FOR EACH ORAL DOSE REFUSAL OF RISPERDAL    LONG ACTING INJECTION OF PERSERIS 90 MG.  MG. MG. I.M.      10/10/2024 1140 by Teri Gomez RN  Outcome: Progressing  10/10/2024 1102 by Teri Gomez RN  Reactivated  10/10/2024 1101 by Teri Gomez RN  Outcome: Adequate for Discharge     Problem: Behavior  Goal: Pt/Family maintain appropriate behavior and adhere to behavioral management agreement, if implemented  Description: INTERVENTIONS:  1. Assess patient/family's coping skills and  non-compliant behavior (including use of illegal substances)  2. Notify security of behavior or suspected illegal substances which indicate the need for search of the family and/or belongings  3. Encourage verbalization of thoughts and concerns in a socially appropriate manner  4. Utilize positive, consistent limit setting strategies supporting safety of patient, staff and others  5. Encourage participation in the decision making process about the behavioral management agreement  6. If a visitor's behavior poses a threat to 
  Problem: Depression  Goal: Will be euthymic at discharge  Description: INTERVENTIONS:  1. Administer medication as ordered  2. Provide emotional support via 1:1 interaction with staff  3. Encourage involvement in milieu/groups/activities  4. Monitor for social isolation  10/11/2024 0904 by Melinda Beatty RN  Outcome: Progressing     Problem: Psychosis  Goal: Will report no hallucinations or delusions  Description: INTERVENTIONS:  1. Administer medication as  ordered  2. Assist with reality testing to support increasing orientation  3. Assess if patient's hallucinations or delusions are encouraging self harm or harm to others and intervene as appropriate    APPLY FOR FORCED MEDICATIONS THROUGH PROBATE COURT:    RISPERDAL 1 MG. TO 2 MG. ORAL DOSE TWICE DAILY    ADMINISTER HALDOL 5 MG. I.M. FOR EACH ORAL DOSE REFUSAL OF RISPERDAL    LONG ACTING INJECTION OF PERSERIS 90 MG.  MG. MG. I.M.      10/11/2024 0904 by Melinda Beatty RN  Outcome: Progressing     Problem: Behavior  Goal: Pt/Family maintain appropriate behavior and adhere to behavioral management agreement, if implemented  Description: INTERVENTIONS:  1. Assess patient/family's coping skills and  non-compliant behavior (including use of illegal substances)  2. Notify security of behavior or suspected illegal substances which indicate the need for search of the family and/or belongings  3. Encourage verbalization of thoughts and concerns in a socially appropriate manner  4. Utilize positive, consistent limit setting strategies supporting safety of patient, staff and others  5. Encourage participation in the decision making process about the behavioral management agreement  6. If a visitor's behavior poses a threat to safety call refer to organization policy.  7. Initiate consult with , Psychosocial CNS, Spiritual Care as appropriate  10/11/2024 0904 by Melinda Beatty RN  Outcome: Progressing      Patient denies SI/HI/Hallucinations. Patient is 
  Problem: Depression  Goal: Will be euthymic at discharge  Description: INTERVENTIONS:  1. Administer medication as ordered  2. Provide emotional support via 1:1 interaction with staff  3. Encourage involvement in milieu/groups/activities  4. Monitor for social isolation  10/12/2024 1314 by Sondra Shanks RN  Outcome: Progressing  10/12/2024 0510 by Joann Hand RN  Outcome: Progressing     Problem: Psychosis  Goal: Will report no hallucinations or delusions  Description: INTERVENTIONS:  1. Administer medication as  ordered  2. Assist with reality testing to support increasing orientation  3. Assess if patient's hallucinations or delusions are encouraging self harm or harm to others and intervene as appropriate    APPLY FOR FORCED MEDICATIONS THROUGH PROBATE COURT:    RISPERDAL 1 MG. TO 2 MG. ORAL DOSE TWICE DAILY    ADMINISTER HALDOL 5 MG. I.M. FOR EACH ORAL DOSE REFUSAL OF RISPERDAL    LONG ACTING INJECTION OF PERSERIS 90 MG.  MG. MG. I.M.      10/12/2024 1314 by Sondra Shanks RN  Outcome: Progressing  10/12/2024 0510 by Joann Hand RN  Outcome: Progressing     Problem: Behavior  Goal: Pt/Family maintain appropriate behavior and adhere to behavioral management agreement, if implemented  Description: INTERVENTIONS:  1. Assess patient/family's coping skills and  non-compliant behavior (including use of illegal substances)  2. Notify security of behavior or suspected illegal substances which indicate the need for search of the family and/or belongings  3. Encourage verbalization of thoughts and concerns in a socially appropriate manner  4. Utilize positive, consistent limit setting strategies supporting safety of patient, staff and others  5. Encourage participation in the decision making process about the behavioral management agreement  6. If a visitor's behavior poses a threat to safety call refer to organization policy.  7. Initiate consult with , Psychosocial CNS, Spiritual Care 
  Problem: Depression  Goal: Will be euthymic at discharge  Description: INTERVENTIONS:  1. Administer medication as ordered  2. Provide emotional support via 1:1 interaction with staff  3. Encourage involvement in milieu/groups/activities  4. Monitor for social isolation  10/12/2024 2110 by Ayanna Dallas RN  Outcome: Progressing  10/12/2024 1314 by Sondra Shanks RN  Outcome: Progressing     Problem: Psychosis  Goal: Will report no hallucinations or delusions  Description: INTERVENTIONS:  1. Administer medication as  ordered  2. Assist with reality testing to support increasing orientation  3. Assess if patient's hallucinations or delusions are encouraging self harm or harm to others and intervene as appropriate    APPLY FOR FORCED MEDICATIONS THROUGH PROBATE COURT:    RISPERDAL 1 MG. TO 2 MG. ORAL DOSE TWICE DAILY    ADMINISTER HALDOL 5 MG. I.M. FOR EACH ORAL DOSE REFUSAL OF RISPERDAL    LONG ACTING INJECTION OF PERSERIS 90 MG.  MG. MG. I.M.      10/12/2024 2110 by Ayanna Dallas RN  Outcome: Progressing  10/12/2024 1314 by Sondra Shanks RN  Outcome: Progressing     Problem: Behavior  Goal: Pt/Family maintain appropriate behavior and adhere to behavioral management agreement, if implemented  Description: INTERVENTIONS:  1. Assess patient/family's coping skills and  non-compliant behavior (including use of illegal substances)  2. Notify security of behavior or suspected illegal substances which indicate the need for search of the family and/or belongings  3. Encourage verbalization of thoughts and concerns in a socially appropriate manner  4. Utilize positive, consistent limit setting strategies supporting safety of patient, staff and others  5. Encourage participation in the decision making process about the behavioral management agreement  6. If a visitor's behavior poses a threat to safety call refer to organization policy.  7. Initiate consult with , Psychosocial CNS, Spiritual Care 
  Problem: Depression  Goal: Will be euthymic at discharge  Description: INTERVENTIONS:  1. Administer medication as ordered  2. Provide emotional support via 1:1 interaction with staff  3. Encourage involvement in milieu/groups/activities  4. Monitor for social isolation  10/14/2024 1015 by Teri Gomez RN  Outcome: Progressing  10/14/2024 0553 by Joann Hand RN  Outcome: Progressing     Problem: Psychosis  Goal: Will report no hallucinations or delusions  Description: INTERVENTIONS:  1. Administer medication as  ordered  2. Assist with reality testing to support increasing orientation  3. Assess if patient's hallucinations or delusions are encouraging self harm or harm to others and intervene as appropriate    APPLY FOR FORCED MEDICATIONS THROUGH PROBATE COURT:    RISPERDAL 1 MG. TO 2 MG. ORAL DOSE TWICE DAILY    ADMINISTER HALDOL 5 MG. I.M. FOR EACH ORAL DOSE REFUSAL OF RISPERDAL    LONG ACTING INJECTION OF PERSERIS 90 MG.  MG. MG. I.M.      10/14/2024 1015 by Teri Gomez RN  Outcome: Progressing  10/14/2024 0553 by Joann Hand RN  Outcome: Progressing     Problem: Behavior  Goal: Pt/Family maintain appropriate behavior and adhere to behavioral management agreement, if implemented  Description: INTERVENTIONS:  1. Assess patient/family's coping skills and  non-compliant behavior (including use of illegal substances)  2. Notify security of behavior or suspected illegal substances which indicate the need for search of the family and/or belongings  3. Encourage verbalization of thoughts and concerns in a socially appropriate manner  4. Utilize positive, consistent limit setting strategies supporting safety of patient, staff and others  5. Encourage participation in the decision making process about the behavioral management agreement  6. If a visitor's behavior poses a threat to safety call refer to organization policy.  7. Initiate consult with , Psychosocial CNS, Spiritual Care as 
  Problem: Depression  Goal: Will be euthymic at discharge  Description: INTERVENTIONS:  1. Administer medication as ordered  2. Provide emotional support via 1:1 interaction with staff  3. Encourage involvement in milieu/groups/activities  4. Monitor for social isolation  10/3/2024 0102 by Evelio Weinstein RN  Outcome: Progressing     Problem: Psychosis  Goal: Will report no hallucinations or delusions  Description: INTERVENTIONS:  1. Administer medication as  ordered  2. Assist with reality testing to support increasing orientation  3. Assess if patient's hallucinations or delusions are encouraging self harm or harm to others and intervene as appropriate  Outcome: Progressing     Problem: Behavior  Goal: Pt/Family maintain appropriate behavior and adhere to behavioral management agreement, if implemented  Description: INTERVENTIONS:  1. Assess patient/family's coping skills and  non-compliant behavior (including use of illegal substances)  2. Notify security of behavior or suspected illegal substances which indicate the need for search of the family and/or belongings  3. Encourage verbalization of thoughts and concerns in a socially appropriate manner  4. Utilize positive, consistent limit setting strategies supporting safety of patient, staff and others  5. Encourage participation in the decision making process about the behavioral management agreement  6. If a visitor's behavior poses a threat to safety call refer to organization policy.  7. Initiate consult with , Psychosocial CNS, Spiritual Care as appropriate  10/3/2024 0102 by Evelio Weinstein RN  Outcome: Progressing     Problem: Anxiety  Goal: Will report anxiety at manageable levels  Description: INTERVENTIONS:  1. Administer medication as ordered  2. Teach and rehearse alternative coping skills  3. Provide emotional support with 1:1 interaction with staff  10/3/2024 0102 by Evelio Weinstein RN  Outcome: Progressing     Problem: Sleep 
  Problem: Depression  Goal: Will be euthymic at discharge  Description: INTERVENTIONS:  1. Administer medication as ordered  2. Provide emotional support via 1:1 interaction with staff  3. Encourage involvement in milieu/groups/activities  4. Monitor for social isolation  10/5/2024 1003 by Sondra Shanks RN  Outcome: Progressing  10/4/2024 2245 by Booker Hurt RN  Outcome: Progressing     Problem: Psychosis  Goal: Will report no hallucinations or delusions  Description: INTERVENTIONS:  1. Administer medication as  ordered  2. Assist with reality testing to support increasing orientation  3. Assess if patient's hallucinations or delusions are encouraging self harm or harm to others and intervene as appropriate    APPLY FOR FORCED MEDICATIONS THROUGH PROBATE COURT:    RISPERDAL 1 MG. TO 2 MG. ORAL DOSE TWICE DAILY    ADMINISTER HALDOL 5 MG. I.M. FOR EACH ORAL DOSE REFUSAL OF RISPERDAL    LONG ACTING INJECTION OF PERSERIS 90 MG.  MG. MG. I.M.      10/5/2024 1003 by Sondra Shanks RN  Outcome: Progressing  10/4/2024 2245 by Booker Hurt RN  Outcome: Progressing     Problem: Behavior  Goal: Pt/Family maintain appropriate behavior and adhere to behavioral management agreement, if implemented  Description: INTERVENTIONS:  1. Assess patient/family's coping skills and  non-compliant behavior (including use of illegal substances)  2. Notify security of behavior or suspected illegal substances which indicate the need for search of the family and/or belongings  3. Encourage verbalization of thoughts and concerns in a socially appropriate manner  4. Utilize positive, consistent limit setting strategies supporting safety of patient, staff and others  5. Encourage participation in the decision making process about the behavioral management agreement  6. If a visitor's behavior poses a threat to safety call refer to organization policy.  7. Initiate consult with , Psychosocial CNS, Spiritual Care as 
  Problem: Depression  Goal: Will be euthymic at discharge  Description: INTERVENTIONS:  1. Administer medication as ordered  2. Provide emotional support via 1:1 interaction with staff  3. Encourage involvement in milieu/groups/activities  4. Monitor for social isolation  10/6/2024 0950 by Sondra Shanks RN  Outcome: Progressing  10/5/2024 2117 by Booker Hurt RN  Outcome: Progressing     Problem: Psychosis  Goal: Will report no hallucinations or delusions  Description: INTERVENTIONS:  1. Administer medication as  ordered  2. Assist with reality testing to support increasing orientation  3. Assess if patient's hallucinations or delusions are encouraging self harm or harm to others and intervene as appropriate    APPLY FOR FORCED MEDICATIONS THROUGH PROBATE COURT:    RISPERDAL 1 MG. TO 2 MG. ORAL DOSE TWICE DAILY    ADMINISTER HALDOL 5 MG. I.M. FOR EACH ORAL DOSE REFUSAL OF RISPERDAL    LONG ACTING INJECTION OF PERSERIS 90 MG.  MG. MG. I.M.      10/6/2024 0950 by Sondra Shanks RN  Outcome: Progressing  10/5/2024 2117 by Booker Hutr RN  Outcome: Progressing     Problem: Behavior  Goal: Pt/Family maintain appropriate behavior and adhere to behavioral management agreement, if implemented  Description: INTERVENTIONS:  1. Assess patient/family's coping skills and  non-compliant behavior (including use of illegal substances)  2. Notify security of behavior or suspected illegal substances which indicate the need for search of the family and/or belongings  3. Encourage verbalization of thoughts and concerns in a socially appropriate manner  4. Utilize positive, consistent limit setting strategies supporting safety of patient, staff and others  5. Encourage participation in the decision making process about the behavioral management agreement  6. If a visitor's behavior poses a threat to safety call refer to organization policy.  7. Initiate consult with , Psychosocial CNS, Spiritual Care as 
  Problem: Depression  Goal: Will be euthymic at discharge  Description: INTERVENTIONS:  1. Administer medication as ordered  2. Provide emotional support via 1:1 interaction with staff  3. Encourage involvement in milieu/groups/activities  4. Monitor for social isolation  10/7/2024 1329 by Teri Gomez RN  Outcome: Progressing  10/7/2024 0422 by Joann Hand RN  Outcome: Progressing     Problem: Sleep Disturbance  Goal: Will exhibit normal sleeping pattern  Description: INTERVENTIONS:  1. Administer medication as ordered  2. Decrease environmental stimuli, including noise, as appropriate  3. Discourage social isolation and naps during the day  10/7/2024 1329 by Teri Gomez RN  Outcome: Progressing  10/7/2024 0422 by Joann Hand RN  Outcome: Progressing     Problem: Spiritual Care  Goal: Pt/Family able to move forward in process of forgiving self, others, and/or higher power  Description: INTERVENTIONS:  1. Assist patient/family to overcome blocks to healing by use of spiritual practices (prayer, meditation, guided imagery, reiki, breath work, etc).  2. De-myth guilt and help patient/family identify possible irrational spiritual/cultural beliefs and values.  3. Explore possibilities of making amends & reconciliation with self, others, and/or a greater power.  4. Guide patient/family in identifying painful feelings of guilt.  5. Help patient/famiy explore and identify spiritual beliefs, cultural understandings or values that may help or hinder letting go of issue.  6. Help patient/family explore feelings of anger, bitterness, resentment.  7. Help patient/family identify and examine the situation in which these feelings are experienced.  8. Help patient/family identify destructive displacement of feelings onto other individuals.  9. Invite use of sacraments/rituals/ceremonies as appropriate (e.g. - confession, anointing, smudging).  10. Refer patient/family to formal counseling and/or to [facility] for 
  Problem: Depression  Goal: Will be euthymic at discharge  Description: INTERVENTIONS:  1. Administer medication as ordered  2. Provide emotional support via 1:1 interaction with staff  3. Encourage involvement in milieu/groups/activities  4. Monitor for social isolation  10/7/2024 2136 by Chantelle Smith RN  Outcome: Progressing  10/7/2024 1329 by Teri Gomez RN  Outcome: Progressing     Problem: Psychosis  Goal: Will report no hallucinations or delusions  Description: INTERVENTIONS:  1. Administer medication as  ordered  2. Assist with reality testing to support increasing orientation  3. Assess if patient's hallucinations or delusions are encouraging self harm or harm to others and intervene as appropriate    APPLY FOR FORCED MEDICATIONS THROUGH PROBATE COURT:    RISPERDAL 1 MG. TO 2 MG. ORAL DOSE TWICE DAILY    ADMINISTER HALDOL 5 MG. I.M. FOR EACH ORAL DOSE REFUSAL OF RISPERDAL    LONG ACTING INJECTION OF PERSERIS 90 MG.  MG. MG. I.M.      10/7/2024 2136 by Chantelle Smith RN  Outcome: Progressing  10/7/2024 1329 by Teri Gomez RN  Outcome: Not Progressing     Problem: Behavior  Goal: Pt/Family maintain appropriate behavior and adhere to behavioral management agreement, if implemented  Description: INTERVENTIONS:  1. Assess patient/family's coping skills and  non-compliant behavior (including use of illegal substances)  2. Notify security of behavior or suspected illegal substances which indicate the need for search of the family and/or belongings  3. Encourage verbalization of thoughts and concerns in a socially appropriate manner  4. Utilize positive, consistent limit setting strategies supporting safety of patient, staff and others  5. Encourage participation in the decision making process about the behavioral management agreement  6. If a visitor's behavior poses a threat to safety call refer to organization policy.  7. Initiate consult with , Psychosocial CNS, Spiritual Care 
  Problem: Depression  Goal: Will be euthymic at discharge  Description: INTERVENTIONS:  1. Administer medication as ordered  2. Provide emotional support via 1:1 interaction with staff  3. Encourage involvement in milieu/groups/activities  4. Monitor for social isolation  9/29/2024 2006 by Sondra Shanks RN  Outcome: Progressing  9/29/2024 1055 by Pako Alcantara RN  Outcome: Progressing     Problem: Behavior  Goal: Pt/Family maintain appropriate behavior and adhere to behavioral management agreement, if implemented  Description: INTERVENTIONS:  1. Assess patient/family's coping skills and  non-compliant behavior (including use of illegal substances)  2. Notify security of behavior or suspected illegal substances which indicate the need for search of the family and/or belongings  3. Encourage verbalization of thoughts and concerns in a socially appropriate manner  4. Utilize positive, consistent limit setting strategies supporting safety of patient, staff and others  5. Encourage participation in the decision making process about the behavioral management agreement  6. If a visitor's behavior poses a threat to safety call refer to organization policy.  7. Initiate consult with , Psychosocial CNS, Spiritual Care as appropriate  Outcome: Progressing     Problem: Anxiety  Goal: Will report anxiety at manageable levels  Description: INTERVENTIONS:  1. Administer medication as ordered  2. Teach and rehearse alternative coping skills  3. Provide emotional support with 1:1 interaction with staff  9/29/2024 2006 by Sondra Shanks RN  Outcome: Progressing  9/29/2024 1055 by Pako Alcantara RN  Outcome: Progressing     Problem: Psychosis  Goal: Will report no hallucinations or delusions  Description: INTERVENTIONS:  1. Administer medication as  ordered  2. Assist with reality testing to support increasing orientation  3. Assess if patient's hallucinations or delusions are encouraging self harm or harm to 
  Problem: Depression  Goal: Will be euthymic at discharge  Description: INTERVENTIONS:  1. Administer medication as ordered  2. Provide emotional support via 1:1 interaction with staff  3. Encourage involvement in milieu/groups/activities  4. Monitor for social isolation  Outcome: Progressing     Problem: Anxiety  Goal: Will report anxiety at manageable levels  Description: INTERVENTIONS:  1. Administer medication as ordered  2. Teach and rehearse alternative coping skills  3. Provide emotional support with 1:1 interaction with staff  Outcome: Progressing     Problem: Involuntary Admit  Goal: Will cooperate with staff recommendations and doctor's orders and will demonstrate appropriate behavior  Description: INTERVENTIONS:  1. Treat underlying conditions and offer medication as ordered  2. Educate regarding involuntary admission procedures and rules  3. Contain excessive/inappropriate behavior per unit and hospital policies  10/4/2024 0857 by Ifeoma Posey RN  Outcome: Progressing  10/3/2024 2203 by Ulysses Mendoza RN  Outcome: Not Progressing     Problem: Psychosis  Goal: Will report no hallucinations or delusions  Description: INTERVENTIONS:  1. Administer medication as  ordered  2. Assist with reality testing to support increasing orientation  3. Assess if patient's hallucinations or delusions are encouraging self harm or harm to others and intervene as appropriate    APPLY FOR FORCED MEDICATIONS THROUGH PROBATE COURT:    RISPERDAL 1 MG. TO 2 MG. ORAL DOSE TWICE DAILY    ADMINISTER HALDOL 5 MG. I.M. FOR EACH ORAL DOSE REFUSAL OF RISPERDAL    LONG ACTING INJECTION OF PERSERIS 90 MG.  MG. MG. I.M.      10/3/2024 2203 by Ulysses Mendoza RN  Outcome: Not Progressing     Problem: Involuntary Admit  Goal: Will cooperate with staff recommendations and doctor's orders and will demonstrate appropriate behavior  Description: INTERVENTIONS:  1. Treat underlying conditions and offer medication as ordered  2. Educate 
  Problem: Depression  Goal: Will be euthymic at discharge  Description: INTERVENTIONS:  1. Administer medication as ordered  2. Provide emotional support via 1:1 interaction with staff  3. Encourage involvement in milieu/groups/activities  4. Monitor for social isolation  Outcome: Progressing     Problem: Behavior  Goal: Pt/Family maintain appropriate behavior and adhere to behavioral management agreement, if implemented  Description: INTERVENTIONS:  1. Assess patient/family's coping skills and  non-compliant behavior (including use of illegal substances)  2. Notify security of behavior or suspected illegal substances which indicate the need for search of the family and/or belongings  3. Encourage verbalization of thoughts and concerns in a socially appropriate manner  4. Utilize positive, consistent limit setting strategies supporting safety of patient, staff and others  5. Encourage participation in the decision making process about the behavioral management agreement  6. If a visitor's behavior poses a threat to safety call refer to organization policy.  7. Initiate consult with , Psychosocial CNS, Spiritual Care as appropriate  Outcome: Progressing     Problem: Anxiety  Goal: Will report anxiety at manageable levels  Description: INTERVENTIONS:  1. Administer medication as ordered  2. Teach and rehearse alternative coping skills  3. Provide emotional support with 1:1 interaction with staff  Outcome: Progressing    Patient denies suicidal ideation, homicidal ideations and AVH.  Presents calm and cooperative during assessment.  Patient is out on the unit and is social with select peers.   No complaints or concerns verbalized at this time.                   
  Problem: Depression  Goal: Will be euthymic at discharge  Description: INTERVENTIONS:  1. Administer medication as ordered  2. Provide emotional support via 1:1 interaction with staff  3. Encourage involvement in milieu/groups/activities  4. Monitor for social isolation  Outcome: Progressing     Problem: Psychosis  Goal: Will report no hallucinations or delusions  Description: INTERVENTIONS:  1. Administer medication as  ordered  2. Assist with reality testing to support increasing orientation  3. Assess if patient's hallucinations or delusions are encouraging self harm or harm to others and intervene as appropriate    APPLY FOR FORCED MEDICATIONS THROUGH PROBATE COURT:    RISPERDAL 1 MG. TO 2 MG. ORAL DOSE TWICE DAILY    ADMINISTER HALDOL 5 MG. I.M. FOR EACH ORAL DOSE REFUSAL OF RISPERDAL    LONG ACTING INJECTION OF PERSERIS 90 MG.  MG. MG. I.M.      Outcome: Progressing     Problem: Behavior  Goal: Pt/Family maintain appropriate behavior and adhere to behavioral management agreement, if implemented  Description: INTERVENTIONS:  1. Assess patient/family's coping skills and  non-compliant behavior (including use of illegal substances)  2. Notify security of behavior or suspected illegal substances which indicate the need for search of the family and/or belongings  3. Encourage verbalization of thoughts and concerns in a socially appropriate manner  4. Utilize positive, consistent limit setting strategies supporting safety of patient, staff and others  5. Encourage participation in the decision making process about the behavioral management agreement  6. If a visitor's behavior poses a threat to safety call refer to organization policy.  7. Initiate consult with , Psychosocial CNS, Spiritual Care as appropriate  Outcome: Progressing     Problem: Anxiety  Goal: Will report anxiety at manageable levels  Description: INTERVENTIONS:  1. Administer medication as ordered  2. Teach and rehearse 
  Problem: Depression  Goal: Will be euthymic at discharge  Description: INTERVENTIONS:  1. Administer medication as ordered  2. Provide emotional support via 1:1 interaction with staff  3. Encourage involvement in milieu/groups/activities  4. Monitor for social isolation  Outcome: Progressing     Problem: Psychosis  Goal: Will report no hallucinations or delusions  Description: INTERVENTIONS:  1. Administer medication as  ordered  2. Assist with reality testing to support increasing orientation  3. Assess if patient's hallucinations or delusions are encouraging self harm or harm to others and intervene as appropriate    APPLY FOR FORCED MEDICATIONS THROUGH PROBATE COURT:    RISPERDAL 1 MG. TO 2 MG. ORAL DOSE TWICE DAILY    ADMINISTER HALDOL 5 MG. I.M. FOR EACH ORAL DOSE REFUSAL OF RISPERDAL    LONG ACTING INJECTION OF PERSERIS 90 MG.  MG. MG. I.M.      Outcome: Progressing     Problem: Behavior  Goal: Pt/Family maintain appropriate behavior and adhere to behavioral management agreement, if implemented  Description: INTERVENTIONS:  1. Assess patient/family's coping skills and  non-compliant behavior (including use of illegal substances)  2. Notify security of behavior or suspected illegal substances which indicate the need for search of the family and/or belongings  3. Encourage verbalization of thoughts and concerns in a socially appropriate manner  4. Utilize positive, consistent limit setting strategies supporting safety of patient, staff and others  5. Encourage participation in the decision making process about the behavioral management agreement  6. If a visitor's behavior poses a threat to safety call refer to organization policy.  7. Initiate consult with , Psychosocial CNS, Spiritual Care as appropriate  Outcome: Progressing     Problem: Anxiety  Goal: Will report anxiety at manageable levels  Description: INTERVENTIONS:  1. Administer medication as ordered  2. Teach and rehearse 
  Problem: Psychosis  Goal: Will report no hallucinations or delusions  Description: INTERVENTIONS:  1. Administer medication as  ordered  2. Assist with reality testing to support increasing orientation  3. Assess if patient's hallucinations or delusions are encouraging self harm or harm to others and intervene as appropriate    APPLY FOR FORCED MEDICATIONS THROUGH PROBATE COURT:    RISPERDAL 1 MG. TO 2 MG. ORAL DOSE TWICE DAILY    ADMINISTER HALDOL 5 MG. I.M. FOR EACH ORAL DOSE REFUSAL OF RISPERDAL    LONG ACTING INJECTION OF PERSERIS 90 MG.  MG. MG. I.M.      10/3/2024 0959 by Teri Gomez, RN  Outcome: Not Progressing  10/3/2024 0956 by Teri Gomez RN  Note: APPLY FOR FORCE MEDICATION ORDER FOR:    RISPERDAL 1 MG TO 2 MG ORAL DOSE TWICE DAILY    PT. TO RECEIVE HALDOL 5 MG. I.M. FOR EACH ORAL DOSE REFUSED    LONG ACTING INJECTION OF PERSERIS 90 MG.  MG. I.M. TO BE INITIATED IN THE HOSPITAL  10/3/2024 0952 by Kevin Bee RN  Outcome: Not Progressing  10/3/2024 0949 by Kevin Bee RN  Note: Plan for forced medications:    Risperdal: 1-2 MG. PO, BID. If refused, then:  Haldol:   5 MG. I.M. BID.   Long Acting Injection Plan:   Perseris:  MG I.M.   10/3/2024 0102 by Evelio Weinstein, RN  Outcome: Progressing     
  Problem: Psychosis  Goal: Will report no hallucinations or delusions  Description: INTERVENTIONS:  1. Administer medication as  ordered  2. Assist with reality testing to support increasing orientation  3. Assess if patient's hallucinations or delusions are encouraging self harm or harm to others and intervene as appropriate    APPLY FOR FORCED MEDICATIONS THROUGH PROBATE COURT:    RISPERDAL 1 MG. TO 2 MG. ORAL DOSE TWICE DAILY    ADMINISTER HALDOL 5 MG. I.M. FOR EACH ORAL DOSE REFUSAL OF RISPERDAL    LONG ACTING INJECTION OF PERSERIS 90 MG.  MG. MG. I.M.      10/3/2024 2203 by Ulysses Mendoza, RN  Outcome: Not Progressing  10/3/2024 0959 by Teri Gomez RN  Outcome: Not Progressing  10/3/2024 0956 by Teri Gomez RN  Note: APPLY FOR FORCE MEDICATION ORDER FOR:    RISPERDAL 1 MG TO 2 MG ORAL DOSE TWICE DAILY    PT. TO RECEIVE HALDOL 5 MG. I.M. FOR EACH ORAL DOSE REFUSED    LONG ACTING INJECTION OF PERSERIS 90 MG.  MG. I.M. TO BE INITIATED IN THE HOSPITAL  10/3/2024 0952 by Kevin Bee RN  Outcome: Not Progressing  10/3/2024 0949 by Kevin Bee RN  Note: Plan for forced medications:    Risperdal: 1-2 MG. PO, BID. If refused, then:  Haldol:   5 MG. I.M. BID.   Long Acting Injection Plan:   Perseris:  MG I.M.      Problem: Involuntary Admit  Goal: Will cooperate with staff recommendations and doctor's orders and will demonstrate appropriate behavior  Description: INTERVENTIONS:  1. Treat underlying conditions and offer medication as ordered  2. Educate regarding involuntary admission procedures and rules  3. Contain excessive/inappropriate behavior per unit and hospital policies  10/3/2024 2203 by Ulysses Mendoza, RN  Outcome: Not Progressing  10/3/2024 1042 by Kevin Bee RN  Outcome: Not Progressing     
  Problem: Psychosis  Goal: Will report no hallucinations or delusions  Description: INTERVENTIONS:  1. Administer medication as  ordered  2. Assist with reality testing to support increasing orientation  3. Assess if patient's hallucinations or delusions are encouraging self harm or harm to others and intervene as appropriate  10/3/2024 0949 by Kevin Bee, RN  Note: Plan for forced medications:    Risperdal: 1-2 MG. PO, BID. If refused, then:  Haldol:   5 MG. I.M. BID.   Long Acting Injection Plan:   Perseris:  MG I.M.   10/3/2024 0102 by Evelio Weinstein, RN  Outcome: Progressing     
  Problem: Psychosis  Goal: Will report no hallucinations or delusions  Description: INTERVENTIONS:  1. Administer medication as  ordered  2. Assist with reality testing to support increasing orientation  3. Assess if patient's hallucinations or delusions are encouraging self harm or harm to others and intervene as appropriate  10/3/2024 0952 by Kevin Bee, RN  Outcome: Not Progressing  10/3/2024 0949 by Kevin Bee, RN  Note: Plan for forced medications:    Risperdal: 1-2 MG. PO, BID. If refused, then:  Haldol:   5 MG. I.M. BID.   Long Acting Injection Plan:   Perseris:  MG I.M.   10/3/2024 0102 by Evelio Weinstein, RN  Outcome: Progressing     
  Problem: Psychosis  Goal: Will report no hallucinations or delusions  Description: INTERVENTIONS:  1. Administer medication as  ordered  2. Assist with reality testing to support increasing orientation  3. Assess if patient's hallucinations or delusions are encouraging self harm or harm to others and intervene as appropriate  10/3/2024 0956 by Teri Gomez, RN  Note: APPLY FOR FORCE MEDICATION ORDER FOR:    RISPERDAL 1 MG TO 2 MG ORAL DOSE TWICE DAILY    PT. TO RECEIVE HALDOL 5 MG. I.M. FOR EACH ORAL DOSE REFUSED    LONG ACTING INJECTION OF PERSERIS 90 MG.  MG. I.M. TO BE INITIATED IN THE HOSPITAL  10/3/2024 0952 by Kevin Bee RN  Outcome: Not Progressing  10/3/2024 0949 by Kevin Bee RN  Note: Plan for forced medications:    Risperdal: 1-2 MG. PO, BID. If refused, then:  Haldol:   5 MG. I.M. BID.   Long Acting Injection Plan:   Perseris:  MG I.M.   10/3/2024 0102 by Evelio Weinstein, RN  Outcome: Progressing    COURT HEARING FOR FORCED MEDICATIONS ON 10/4/24.     
  Problem: Psychosis  Goal: Will report no hallucinations or delusions  Description: INTERVENTIONS:  1. Administer medication as  ordered  2. Assist with reality testing to support increasing orientation  3. Assess if patient's hallucinations or delusions are encouraging self harm or harm to others and intervene as appropriate  10/3/2024 0959 by Teri Gomez RN  Outcome: Not Progressing  10/3/2024 0956 by Teri Gomez RN  Note: APPLY FOR FORCE MEDICATION ORDER FOR:    RISPERDAL 1 MG TO 2 MG ORAL DOSE TWICE DAILY    PT. TO RECEIVE HALDOL 5 MG. I.M. FOR EACH ORAL DOSE REFUSED    LONG ACTING INJECTION OF PERSERIS 90 MG.  MG. I.M. TO BE INITIATED IN THE HOSPITAL  10/3/2024 0952 by Kevin Bee RN  Outcome: Not Progressing  10/3/2024 0949 by Kevin Bee RN  Note: Plan for forced medications:    Risperdal: 1-2 MG. PO, BID. If refused, then:  Problem: Psychosis  Goal: Will report no hallucinations or delusions  Description: INTERVENTIONS:  1. Administer medication as  ordered  2. Assist with reality testing to support increasing orientation  3. Assess if patient's hallucinations or delusions are encouraging self harm or harm to others and intervene as appropriate  10/3/2024 0959 by Teri Gomez RN  Outcome: Not Progressing  10/3/2024 0956 by Teri Gomez RN  Note: APPLY FOR FORCE MEDICATION ORDER FOR:    RISPERDAL 1 MG TO 2 MG ORAL DOSE TWICE DAILY    PT. TO RECEIVE HALDOL 5 MG. I.M. FOR EACH ORAL DOSE REFUSED    LONG ACTING INJECTION OF PERSERIS 90 MG.  MG. I.M. TO BE INITIATED IN THE HOSPITAL  10/3/2024 0952 by Kevin Bee RN  Outcome: Not Progressing  10/3/2024 0949 by Kevin Bee RN  Note: Plan for forced medications:    Risperdal: 1-2 MG. PO, BID. If refused, then:  Haldol:   5 MG. I.M. BID.   Long Acting Injection Plan:   Perseris:  MG I.M.   10/3/2024 0102 by Evelio Weinstein RN  Outcome: Progressing     Haldol:   5 MG. I.M. BID.   Long Acting Injection Plan:   Perseris:  MG I.M. 
  Problem: Psychosis  Goal: Will report no hallucinations or delusions  Description: INTERVENTIONS:  1. Administer medication as  ordered  2. Assist with reality testing to support increasing orientation  3. Assess if patient's hallucinations or delusions are encouraging self harm or harm to others and intervene as appropriate  9/27/2024 1357 by Chau Earl RN  Outcome: Not Progressing     Problem: Behavior  Goal: Pt/Family maintain appropriate behavior and adhere to behavioral management agreement, if implemented  Description: INTERVENTIONS:  1. Assess patient/family's coping skills and  non-compliant behavior (including use of illegal substances)  2. Notify security of behavior or suspected illegal substances which indicate the need for search of the family and/or belongings  3. Encourage verbalization of thoughts and concerns in a socially appropriate manner  4. Utilize positive, consistent limit setting strategies supporting safety of patient, staff and others  5. Encourage participation in the decision making process about the behavioral management agreement  6. If a visitor's behavior poses a threat to safety call refer to organization policy.  7. Initiate consult with , Psychosocial CNS, Spiritual Care as appropriate  9/27/2024 2332 by Mariaelena John, RN  Outcome: Progressing  9/27/2024 1357 by Chau Earl RN  Outcome: Not Progressing  Flowsheets (Taken 9/27/2024 0427 by Deborah Carty, RN)  Patient/family maintains appropriate behavior and adheres to behavioral management agreement, if implemented:   Assess patient/family’s coping skills and  non-compliant behavior (including use of illegal substances)   Notify security of behavior or suspected illegal substances which indicate the need for search of the patient and/or belongings   Encourage verbalization of thoughts and concerns in a socially appropriate manner   Encourage participation in the decision making process about 
  Problem: Psychosis  Goal: Will report no hallucinations or delusions  Description: INTERVENTIONS:  1. Administer medication as  ordered  2. Assist with reality testing to support increasing orientation  3. Assess if patient's hallucinations or delusions are encouraging self harm or harm to others and intervene as appropriate  Outcome: Not Progressing     Problem: Behavior  Goal: Pt/Family maintain appropriate behavior and adhere to behavioral management agreement, if implemented  Description: INTERVENTIONS:  1. Assess patient/family's coping skills and  non-compliant behavior (including use of illegal substances)  2. Notify security of behavior or suspected illegal substances which indicate the need for search of the family and/or belongings  3. Encourage verbalization of thoughts and concerns in a socially appropriate manner  4. Utilize positive, consistent limit setting strategies supporting safety of patient, staff and others  5. Encourage participation in the decision making process about the behavioral management agreement  6. If a visitor's behavior poses a threat to safety call refer to organization policy.  7. Initiate consult with , Psychosocial CNS, Spiritual Care as appropriate  Outcome: Not Progressing  Flowsheets (Taken 9/27/2024 0427 by Deborah Carty, RN)  Patient/family maintains appropriate behavior and adheres to behavioral management agreement, if implemented:   Assess patient/family’s coping skills and  non-compliant behavior (including use of illegal substances)   Notify security of behavior or suspected illegal substances which indicate the need for search of the patient and/or belongings   Encourage verbalization of thoughts and concerns in a socially appropriate manner   Encourage participation in the decision making process about the behavioral management agreement   Implement a Health Care Agreement if patient meets criteria   If a patient’s behavior jeopardizes the safety 
[facility]  Day 3 Interdisciplinary Treatment Plan NOTE    Review Date & Time:  9/30/24 1000    Patient was in treatment team    Estimated Length of Stay Update:   7 days  Estimated Discharge Date Update:  FRIDAY    EDUCATION:   Learner Progress Toward Treatment Goals: Reviewed results and recommendations of this team    Method: Small group    Outcome: Needs reinforcement    PATIENT GOALS: NONE REPORTED    PLAN/TREATMENT RECOMMENDATIONS UPDATE: ENCOURAGE COMPIANCE, ASSESS FOR PSYCHOSIS, SUPPORTIVE CARE, COLLATERAL INFORMATION, POSSIBLE NEED TO PROBATE TOMORROW R/T PT. NON COMPLIANCE TO MEDICATIONS    GOALS UPDATE:   Time frame for Short-Term Goals:  1 WEEK      Teri Gomez RN  
Patient agreed to start taking his medications and states that he will be compliant. The nurse encouraged the patient to start attending groups as well and the patient agreed. Patient has been friendly and calm. Patient is suspicious of the medication and misinterprets things, but overall more organized and showing improved insight. Pt continues to be exaggerated and circumstantial. Patient thought process is relevant to the conversation. Patient is eating meals. Patient denies any suicidal ideations/homicidal ideations/audio or visual hallucinations.       Problem: Depression  Goal: Will be euthymic at discharge  Description: INTERVENTIONS:  1. Administer medication as ordered  2. Provide emotional support via 1:1 interaction with staff  3. Encourage involvement in milieu/groups/activities  4. Monitor for social isolation  10/8/2024 2029 by Booker Hurt RN  Outcome: Progressing     Problem: Psychosis  Goal: Will report no hallucinations or delusions  Description: INTERVENTIONS:  1. Administer medication as  ordered  2. Assist with reality testing to support increasing orientation  3. Assess if patient's hallucinations or delusions are encouraging self harm or harm to others and intervene as appropriate    APPLY FOR FORCED MEDICATIONS THROUGH PROBATE COURT:    RISPERDAL 1 MG. TO 2 MG. ORAL DOSE TWICE DAILY    ADMINISTER HALDOL 5 MG. I.M. FOR EACH ORAL DOSE REFUSAL OF RISPERDAL    LONG ACTING INJECTION OF PERSERIS 90 MG.  MG. MG. I.M.      10/8/2024 2029 by Booker Hurt RN  Outcome: Progressing     Problem: Behavior  Goal: Pt/Family maintain appropriate behavior and adhere to behavioral management agreement, if implemented  Description: INTERVENTIONS:  1. Assess patient/family's coping skills and  non-compliant behavior (including use of illegal substances)  2. Notify security of behavior or suspected illegal substances which indicate the need for search of the family and/or belongings  3. Encourage 
Patient has been cooperative. Pt is visible on the unit and has been seen socializing. Patient is medication compliant. Patient is attending group activities. Patient is eating meals. Patient denies any suicidal ideations/homicidal ideations/audio or visual hallucinations. Patient is suspicious of the medications and concerned with the possible side effects.       Problem: Depression  Goal: Will be euthymic at discharge  Description: INTERVENTIONS:  1. Administer medication as ordered  2. Provide emotional support via 1:1 interaction with staff  3. Encourage involvement in milieu/groups/activities  4. Monitor for social isolation  10/10/2024 2201 by Booker Hurt RN  Outcome: Progressing     Problem: Psychosis  Goal: Will report no hallucinations or delusions  Description: INTERVENTIONS:  1. Administer medication as  ordered  2. Assist with reality testing to support increasing orientation  3. Assess if patient's hallucinations or delusions are encouraging self harm or harm to others and intervene as appropriate    APPLY FOR FORCED MEDICATIONS THROUGH PROBATE COURT:    RISPERDAL 1 MG. TO 2 MG. ORAL DOSE TWICE DAILY    ADMINISTER HALDOL 5 MG. I.M. FOR EACH ORAL DOSE REFUSAL OF RISPERDAL    LONG ACTING INJECTION OF PERSERIS 90 MG.  MG. MG. I.M.      10/10/2024 2201 by Booker Hurt RN  Outcome: Progressing     Problem: Behavior  Goal: Pt/Family maintain appropriate behavior and adhere to behavioral management agreement, if implemented  Description: INTERVENTIONS:  1. Assess patient/family's coping skills and  non-compliant behavior (including use of illegal substances)  2. Notify security of behavior or suspected illegal substances which indicate the need for search of the family and/or belongings  3. Encourage verbalization of thoughts and concerns in a socially appropriate manner  4. Utilize positive, consistent limit setting strategies supporting safety of patient, staff and others  5. Encourage participation 
Pt out in the common area, minimally social and making needs known. Pt denies SI, HI and AVH. Pt agreed to take guaifenesin, but declined to take any other medications stating that he \"didn't need them.\" Pt smiling and is calm while speaking to this nurse. Pt took a shower and then returned to his room to rest.     Problem: Depression  Goal: Will be euthymic at discharge  Description: INTERVENTIONS:  1. Administer medication as ordered  2. Provide emotional support via 1:1 interaction with staff  3. Encourage involvement in milieu/groups/activities  4. Monitor for social isolation  9/30/2024 2310 by Dave Bryan, RN  Outcome: Progressing     Problem: Psychosis  Goal: Will report no hallucinations or delusions  Description: INTERVENTIONS:  1. Administer medication as  ordered  2. Assist with reality testing to support increasing orientation  3. Assess if patient's hallucinations or delusions are encouraging self harm or harm to others and intervene as appropriate  9/30/2024 2310 by Dave Bryan, RN  Outcome: Progressing     Problem: Behavior  Goal: Pt/Family maintain appropriate behavior and adhere to behavioral management agreement, if implemented  Description: INTERVENTIONS:  1. Assess patient/family's coping skills and  non-compliant behavior (including use of illegal substances)  2. Notify security of behavior or suspected illegal substances which indicate the need for search of the family and/or belongings  3. Encourage verbalization of thoughts and concerns in a socially appropriate manner  4. Utilize positive, consistent limit setting strategies supporting safety of patient, staff and others  5. Encourage participation in the decision making process about the behavioral management agreement  6. If a visitor's behavior poses a threat to safety call refer to organization policy.  7. Initiate consult with , Psychosocial CNS, Spiritual Care as appropriate  9/30/2024 2310 by Randi 
Pt resting in bed apparently asleep with easy even respirations at HS q 15 min electronic rounding.    
RESOLVED FOR DISCHARGE.  
appropriate  3. Discourage social isolation and naps during the day  10/3/2024 1042 by Kevin Bee RN  Outcome: Progressing  10/3/2024 0102 by Evelio Weinstein RN  Outcome: Progressing     Problem: Psychosis  Goal: Will report no hallucinations or delusions  Description: INTERVENTIONS:  1. Administer medication as  ordered  2. Assist with reality testing to support increasing orientation  3. Assess if patient's hallucinations or delusions are encouraging self harm or harm to others and intervene as appropriate    APPLY FOR FORCED MEDICATIONS THROUGH PROBATE COURT:    RISPERDAL 1 MG. TO 2 MG. ORAL DOSE TWICE DAILY    ADMINISTER HALDOL 5 MG. I.M. FOR EACH ORAL DOSE REFUSAL OF RISPERDAL    LONG ACTING INJECTION OF PERSERIS 90 MG.  MG. MG. I.M.      10/3/2024 0959 by Teri Gomez RN  Outcome: Not Progressing  10/3/2024 0956 by Teri Gomez RN  Note: APPLY FOR FORCE MEDICATION ORDER FOR:    RISPERDAL 1 MG TO 2 MG ORAL DOSE TWICE DAILY    PT. TO RECEIVE HALDOL 5 MG. I.M. FOR EACH ORAL DOSE REFUSED    LONG ACTING INJECTION OF PERSERIS 90 MG.  MG. I.M. TO BE INITIATED IN THE HOSPITAL  10/3/2024 0952 by Kevin Bee RN  Outcome: Not Progressing  10/3/2024 0949 by Kevin Bee RN  Note: Plan for forced medications:    Risperdal: 1-2 MG. PO, BID. If refused, then:  Haldol:   5 MG. I.M. BID.   Long Acting Injection Plan:   Perseris:  MG I.M.   10/3/2024 0102 by Evelio Weinstein RN  Outcome: Progressing     Problem: Involuntary Admit  Goal: Will cooperate with staff recommendations and doctor's orders and will demonstrate appropriate behavior  Description: INTERVENTIONS:  1. Treat underlying conditions and offer medication as ordered  2. Educate regarding involuntary admission procedures and rules  3. Contain excessive/inappropriate behavior per unit and hospital policies  10/3/2024 1042 by Kevin Bee RN  Outcome: Not Progressing  10/3/2024 0102 by Evelio Weinstein RN  Outcome: Progressing     
continue to monitor and provide support.  
ordered  2. Decrease environmental stimuli, including noise, as appropriate  3. Discourage social isolation and naps during the day  Outcome: Progressing     Problem: Involuntary Admit  Goal: Will cooperate with staff recommendations and doctor's orders and will demonstrate appropriate behavior  Description: INTERVENTIONS:  1. Treat underlying conditions and offer medication as ordered  2. Educate regarding involuntary admission procedures and rules  3. Contain excessive/inappropriate behavior per unit and hospital policies  Outcome: Progressing     Problem: Spiritual Care  Goal: Pt/Family able to move forward in process of forgiving self, others, and/or higher power  Description: INTERVENTIONS:  1. Assist patient/family to overcome blocks to healing by use of spiritual practices (prayer, meditation, guided imagery, reiki, breath work, etc).  2. De-myth guilt and help patient/family identify possible irrational spiritual/cultural beliefs and values.  3. Explore possibilities of making amends & reconciliation with self, others, and/or a greater power.  4. Guide patient/family in identifying painful feelings of guilt.  5. Help patient/famiy explore and identify spiritual beliefs, cultural understandings or values that may help or hinder letting go of issue.  6. Help patient/family explore feelings of anger, bitterness, resentment.  7. Help patient/family identify and examine the situation in which these feelings are experienced.  8. Help patient/family identify destructive displacement of feelings onto other individuals.  9. Invite use of sacraments/rituals/ceremonies as appropriate (e.g. - confession, anointing, smudging).  10. Refer patient/family to formal counseling and/or to hunter community for further support work.  Outcome: Progressing     Problem: Risk for Elopement  Goal: Patient will not exit the unit/facility without proper excort  Outcome: Progressing     Problem: Pain  Goal: Verbalizes/displays adequate 
supporting safety of patient, staff and others  5. Encourage participation in the decision making process about the behavioral management agreement  6. If a visitor's behavior poses a threat to safety call refer to organization policy.  7. Initiate consult with , Psychosocial CNS, Spiritual Care as appropriate  Outcome: Progressing     Problem: Anxiety  Goal: Will report anxiety at manageable levels  Description: INTERVENTIONS:  1. Administer medication as ordered  2. Teach and rehearse alternative coping skills  3. Provide emotional support with 1:1 interaction with staff  10/4/2024 2245 by Booker Hurt RN  Outcome: Progressing     Problem: Sleep Disturbance  Goal: Will exhibit normal sleeping pattern  Description: INTERVENTIONS:  1. Administer medication as ordered  2. Decrease environmental stimuli, including noise, as appropriate  3. Discourage social isolation and naps during the day  Outcome: Progressing     Problem: Involuntary Admit  Goal: Will cooperate with staff recommendations and doctor's orders and will demonstrate appropriate behavior  Description: INTERVENTIONS:  1. Treat underlying conditions and offer medication as ordered  2. Educate regarding involuntary admission procedures and rules  3. Contain excessive/inappropriate behavior per unit and hospital policies  10/4/2024 2245 by Booker Hurt RN  Outcome: Progressing     Problem: Spiritual Care  Goal: Pt/Family able to move forward in process of forgiving self, others, and/or higher power  Description: INTERVENTIONS:  1. Assist patient/family to overcome blocks to healing by use of spiritual practices (prayer, meditation, guided imagery, reiki, breath work, etc).  2. De-myth guilt and help patient/family identify possible irrational spiritual/cultural beliefs and values.  3. Explore possibilities of making amends & reconciliation with self, others, and/or a greater power.  4. Guide patient/family in identifying painful feelings of 
Verbalizes/displays adequate comfort level or baseline comfort level  9/29/2024 0133 by Evelio Weinstein, RN  Outcome: Progressing     
anger, bitterness, resentment.  7. Help patient/family identify and examine the situation in which these feelings are experienced.  8. Help patient/family identify destructive displacement of feelings onto other individuals.  9. Invite use of sacraments/rituals/ceremonies as appropriate (e.g. - confession, anointing, smudging).  10. Refer patient/family to formal counseling and/or to hunter community for further support work.  Outcome: Progressing     Problem: Risk for Elopement  Goal: Patient will not exit the unit/facility without proper excort  Outcome: Progressing     Problem: Pain  Goal: Verbalizes/displays adequate comfort level or baseline comfort level  Outcome: Progressing     
behavior per unit and hospital policies  Outcome: Not Progressing    PT. WAS ENCOURAGED TO ATTEND GROUPS AND COMPLY WITH MEDICATIONS.    PT. VISITED WITH DAD WITHOUT EVENT.    PT. DENIED SUICIDAL IDEATIONS, HOMICIDAL IDEATIONS AND HALLUCINATIONS.    PT. HAS NO INSIGHT INTO NEED FOR TREATMENT. PT. DID ATTEND AN AFTERNOON GROUP.     
beliefs, cultural understandings or values that may help or hinder letting go of issue.  6. Help patient/family explore feelings of anger, bitterness, resentment.  7. Help patient/family identify and examine the situation in which these feelings are experienced.  8. Help patient/family identify destructive displacement of feelings onto other individuals.  9. Invite use of sacraments/rituals/ceremonies as appropriate (e.g. - confession, anointing, smudging).  10. Refer patient/family to formal counseling and/or to hunter community for further support work.  Outcome: Progressing     Problem: Risk for Elopement  Goal: Patient will not exit the unit/facility without proper excort  Outcome: Progressing     
feelings of anger, bitterness, resentment.  7. Help patient/family identify and examine the situation in which these feelings are experienced.  8. Help patient/family identify destructive displacement of feelings onto other individuals.  9. Invite use of sacraments/rituals/ceremonies as appropriate (e.g. - confession, anointing, smudging).  10. Refer patient/family to formal counseling and/or to [facility] for further support work.  10/9/2024 1204 by Teri Gomez RN  Outcome: Progressing     Problem: Risk for Elopement  Goal: Patient will not exit the unit/facility without proper excort  10/9/2024 1204 by Teri Gomez RN  Outcome: Progressing     Problem: Pain  Goal: Verbalizes/displays adequate comfort level or baseline comfort level  10/9/2024 1204 by Teri Gomez RN  Outcome: Progressing   Patient calm and cooperative up on the unit. Patient denies SI/HI/Hallucinations. Patient denies depression or anxiety at this time. Patient required frequent prompting to take night time medications. Patient is paranoid of medications with concerns of their side effects. Patient education given regarding medication. Patient compliant with medications at this time. Will continue to monitor.

## 2024-10-14 NOTE — CARE COORDINATION
DURGA contacted [facility] [phone] and scheduled transportation to the [facility]. They are aware the  will need to go to the main entrance and call the nurses station upon arrival.

## 2024-10-14 NOTE — CARE COORDINATION
UDRGA received a call from Yelena with the [facility] who stated that the pt is accepted to the [facility] crisis unit today and the pt will need to come with medications in hand. Yelena is requesting the N2N is completed.     DURGA informed pt that he was accepted to the [facility].    DURGA called pt's father Dr. John Ocasio [phone] to inform him of the discharge for today. DURGA left a voicemail requesting a call back.

## 2024-10-14 NOTE — TRANSITION OF CARE
Behavioral Health Transition Record    Patient Name: Jose Ocasio  YOB: 1960   Medical Record Number: [id number]  Date of Admission: 9/26/2024  9:03 PM   Date of Discharge: 10/14/24 1000    Attending Provider: Pancho Null MD   Discharging Provider:  OSMANY GUTIERRES MD  To contact this individual call  [phone] and ask the  to page.  If unavailable, ask to be transferred to Behavioral Health Provider on call.  A Behavioral Health Provider will be available on call 24/7 and during holidays.    Primary Care Provider: Maximiliano Lainez MD    Allergies   Allergen Reactions    Milk-Related Compounds      Bloating/cramping/diarrhea    Molds & Smuts Other (See Comments)     Patient never had a reaction. Tested positive.    Cat Hair Extract Itching and Rash    Dust Mite Extract Itching and Rash       Reason for Admission:   CIRCUMSTANCES OF ADMISSION:      Patient name: Jose Ocasio  Patient's past mental health and addiction history: Unknown past psychiatric history patient denies any past inpatient psychiatric hospitalizations  Patient's presentation to the ED and why the patient needs admission: Was endorsing paranoia, delusions, auditory hallucinations  Legal status:  []  Voluntary  [x]  Involuntary  []  Probate  Triggering/precipitating events: Patient went to local gun shop to buy a gun for protection as he was endorsing delusions and paranoia including neighbors trying to infiltrate his brain  Duration of triggering/precipitating events: Unknown       Admission Diagnosis: Acute psychosis (HCC) [F23]  Patient needs psychiatric hold for evaluation [Z00.8]    * No surgery found *    Results for orders placed or performed during the hospital encounter of 09/26/24   CBC with Auto Differential   Result Value Ref Range    WBC 4.8 4.5 - 11.5 k/uL    RBC 4.70 3.80 - 5.80 m/uL    Hemoglobin 13.9 12.5 - 16.5 g/dL    Hematocrit 43.1 37.0 - 54.0 %    MCV 91.7 80.0 - 99.9 fL    MCH 29.6

## 2024-10-14 NOTE — BH NOTE
Spoke with patient's father via phone (Dr. John Ocasio [phone]). He and his wife expressing frustration over son's hospitalization and stated his son told him if he did not take a \"shot\" that he would not be discharged. Explained to father the \"shot\" recommended is a long acting injectable and explained rationale for request for patient to consider. Also, explained if patient declines the MONSON it would not stop his discharge. However, patient's discharge is planned for the crisis unit and the SW will have to refer and check to see if a bed is available today. Father said he has a noon and a 1pm visitation scheduled and he plans to come in to the hospital to see his son. Continues to ask if he can take his son home and again explained the court order and plan for the crisis unit.

## 2024-10-14 NOTE — CARE COORDINATION
Navigator will submit level of care change to [facility] Mental Health & Recovery Board, [facility] Probate Court, & [facility] once patient steps down to [facility].    Electronically signed by KATJA Villanueva, RYANN on 10/14/2024 at 11:16 AM

## 2024-10-14 NOTE — CARE COORDINATION
Pt met with pt in treatment team. Pt stated that he does not want the MONSON and NP discussed the medications with the pt. Pt stated that he is feeling a lot better and there is no more conflict with the neighbor. Pt stated that he is able to go back to his home and if there is any conflict he will be calling the police for help. Pt stated that he has no depression, anxiety, SI, HI, AVH. Pt was informed that his parents will need to bring his CPAP machine to the hospital or to the [facility] as they will not have one for him there. Pt was understanding of this information.    DURGA faxed the referral to the [facility]     [facility]   Phone: [phone]   Fax: [phone]

## 2024-10-14 NOTE — CARE COORDINATION
SW called the [facility] and was informed that they did not receive pt's referral.    SW faxed referral to the [facility] again.

## 2024-10-14 NOTE — GROUP NOTE
Group Therapy Note    Date: 10/14/2024    Group Start Time: 1050  Group End Time: 1135  Group Topic: Cognitive Skills    [facility]    Madison Stephens MSW, LSW        Group Therapy Note    Attendees: 20       Patient's Goal:  Pt will be able to identify different ways to actively communicate and show others that they are listening and and understanding.     Notes:  pt participated in group and made connections.     Status After Intervention:  Improved    Participation Level: Active Listener and Interactive    Participation Quality: Appropriate, Attentive, and Sharing      Speech:  normal      Thought Process/Content: Logical      Affective Functioning: Congruent      Mood: anxious      Level of consciousness:  Alert, Oriented x4, and Attentive      Response to Learning: Able to verbalize current knowledge/experience, Able to verbalize/acknowledge new learning, Able to retain information, and Capable of insight      Endings: None Reported    Modes of Intervention: Education, Support, Socialization, Exploration, Clarifying, and Problem-solving      Discipline Responsible: /Counselor      Signature:  KATJA Perales LSW

## 2024-10-14 NOTE — GROUP NOTE
Group Therapy Note    Date: 10/14/2024    Group Start Time: 0930  Group End Time: 0955  Group Topic: Community Meeting    Marta Wing CTRS    Group Therapy Note    Attendees: 19    Date: 10/14/2024  Start Time: 0930  End Time:  0955  Number of Participants: 19    Type of Group: Community Meeting    Patient's Goal:  Increased awareness of functions of the unit, staffing and programming. Patients shared goal for the day.    Notes:  Patient was an active listener in group. Patient identified goal for the day as, \"Stay positive.\"    Status After Intervention:  Improved    Participation Level: Active Listener and Interactive    Participation Quality: Appropriate, Attentive, and Sharing      Speech:  normal      Thought Process/Content: Logical  Linear      Affective Functioning: Congruent      Mood:  Appropriate      Level of consciousness:  Alert and Attentive      Response to Learning: Able to verbalize current knowledge/experience, Able to verbalize/acknowledge new learning, Able to retain information, Capable of insight, Able to change behavior, and Progressing to goal      Endings: None Reported    Modes of Intervention: Education, Support, Socialization, Exploration, Clarifying, and Problem-solving      Discipline Responsible: Psychoeducational Specialist      Signature:  CRISTEL Montalvo

## 2024-10-15 NOTE — CARE COORDINATION
10/15/24 1142   General Information   Contact made? Y   Which attempt is this? 1   Reason patient was called? Discharge Follow Up     SW called the [facility] and spoke with Nuvia who stated that the pt is there and there are no questions at this time.

## 2024-11-15 ENCOUNTER — CASE MANAGEMENT (OUTPATIENT)
Dept: PSYCHIATRY | Age: 64
End: 2024-11-15

## 2024-11-15 NOTE — PROGRESS NOTES
DURGA received call from pt ([phone]) requesting hospital excuse be emailed to him at [email or web address] for the dates that he was admitted from 9/26/24-10/14/24. SW emailed excuse.

## 2025-01-25 ENCOUNTER — APPOINTMENT (OUTPATIENT)
Dept: RADIOLOGY | Facility: HOSPITAL | Age: 65
End: 2025-01-25
Payer: MEDICAID

## 2025-01-25 ENCOUNTER — HOSPITAL ENCOUNTER (OUTPATIENT)
Facility: HOSPITAL | Age: 65
Setting detail: OBSERVATION
End: 2025-01-25
Attending: EMERGENCY MEDICINE | Admitting: SURGERY
Payer: MEDICAID
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
## 2025-01-25 DIAGNOSIS — S42.102A CLOSED FRACTURE OF LEFT SCAPULA, UNSPECIFIED PART OF SCAPULA, INITIAL ENCOUNTER: ICD-10-CM

## 2025-01-25 DIAGNOSIS — K59.03 DRUG-INDUCED CONSTIPATION: ICD-10-CM

## 2025-01-25 DIAGNOSIS — S42.115B: ICD-10-CM

## 2025-01-25 LAB
ANION GAP BLDV CALCULATED.4IONS-SCNC: 9 MMOL/L (ref 10–25)
ANION GAP BLDV CALCULATED.4IONS-SCNC: 9 MMOL/L (ref 10–25)
BASE EXCESS BLDV CALC-SCNC: 5.3 MMOL/L (ref -2–3)
BASE EXCESS BLDV CALC-SCNC: 5.3 MMOL/L (ref -2–3)
BODY TEMPERATURE: 37 DEGREES CELSIUS
BODY TEMPERATURE: 37 DEGREES CELSIUS
CA-I BLDV-SCNC: 1.19 MMOL/L (ref 1.1–1.33)
CA-I BLDV-SCNC: 1.19 MMOL/L (ref 1.1–1.33)
CHLORIDE BLDV-SCNC: 99 MMOL/L (ref 98–107)
CHLORIDE BLDV-SCNC: 99 MMOL/L (ref 98–107)
GLUCOSE BLDV-MCNC: 114 MG/DL (ref 74–99)
GLUCOSE BLDV-MCNC: 114 MG/DL (ref 74–99)
HCO3 BLDV-SCNC: 31.6 MMOL/L (ref 22–26)
HCO3 BLDV-SCNC: 31.6 MMOL/L (ref 22–26)
HCT VFR BLD EST: 49 % (ref 41–52)
HCT VFR BLD EST: 49 % (ref 41–52)
HGB BLDV-MCNC: 16.2 G/DL (ref 13.5–17.5)
HGB BLDV-MCNC: 16.2 G/DL (ref 13.5–17.5)
LACTATE BLDV-SCNC: 1.1 MMOL/L (ref 0.4–2)
LACTATE BLDV-SCNC: 1.1 MMOL/L (ref 0.4–2)
OXYHGB MFR BLDV: 20.8 % (ref 45–75)
OXYHGB MFR BLDV: 20.8 % (ref 45–75)
PCO2 BLDV: 51 MM HG (ref 41–51)
PCO2 BLDV: 51 MM HG (ref 41–51)
PH BLDV: 7.4 PH (ref 7.33–7.43)
PH BLDV: 7.4 PH (ref 7.33–7.43)
PO2 BLDV: 14 MM HG (ref 35–45)
PO2 BLDV: 14 MM HG (ref 35–45)
POTASSIUM BLDV-SCNC: 3.6 MMOL/L (ref 3.5–5.3)
POTASSIUM BLDV-SCNC: 3.6 MMOL/L (ref 3.5–5.3)
SAO2 % BLDV: 21 % (ref 45–75)
SAO2 % BLDV: 21 % (ref 45–75)
SODIUM BLDV-SCNC: 136 MMOL/L (ref 136–145)
SODIUM BLDV-SCNC: 136 MMOL/L (ref 136–145)

## 2025-01-25 PROCEDURE — 71045 X-RAY EXAM CHEST 1 VIEW: CPT

## 2025-01-25 PROCEDURE — 72128 CT CHEST SPINE W/O DYE: CPT

## 2025-01-25 PROCEDURE — 72170 X-RAY EXAM OF PELVIS: CPT

## 2025-01-25 PROCEDURE — 2500000004 HC RX 250 GENERAL PHARMACY W/ HCPCS (ALT 636 FOR OP/ED): Performed by: EMERGENCY MEDICINE

## 2025-01-25 PROCEDURE — 72170 X-RAY EXAM OF PELVIS: CPT | Performed by: RADIOLOGY

## 2025-01-25 PROCEDURE — 83605 ASSAY OF LACTIC ACID: CPT | Performed by: EMERGENCY MEDICINE

## 2025-01-25 PROCEDURE — 76376 3D RENDER W/INTRP POSTPROCES: CPT

## 2025-01-25 PROCEDURE — 72125 CT NECK SPINE W/O DYE: CPT | Performed by: RADIOLOGY

## 2025-01-25 PROCEDURE — 84132 ASSAY OF SERUM POTASSIUM: CPT

## 2025-01-25 PROCEDURE — 82077 ASSAY SPEC XCP UR&BREATH IA: CPT | Performed by: EMERGENCY MEDICINE

## 2025-01-25 PROCEDURE — 80053 COMPREHEN METABOLIC PANEL: CPT | Performed by: EMERGENCY MEDICINE

## 2025-01-25 PROCEDURE — 86850 RBC ANTIBODY SCREEN: CPT | Performed by: EMERGENCY MEDICINE

## 2025-01-25 PROCEDURE — 85610 PROTHROMBIN TIME: CPT | Performed by: EMERGENCY MEDICINE

## 2025-01-25 PROCEDURE — 82330 ASSAY OF CALCIUM: CPT

## 2025-01-25 PROCEDURE — 2550000001 HC RX 255 CONTRASTS: Performed by: EMERGENCY MEDICINE

## 2025-01-25 PROCEDURE — 72132 CT LUMBAR SPINE W/DYE: CPT | Performed by: RADIOLOGY

## 2025-01-25 PROCEDURE — 85025 COMPLETE CBC W/AUTO DIFF WBC: CPT | Performed by: EMERGENCY MEDICINE

## 2025-01-25 PROCEDURE — 86900 BLOOD TYPING SEROLOGIC ABO: CPT | Performed by: EMERGENCY MEDICINE

## 2025-01-25 PROCEDURE — 72125 CT NECK SPINE W/O DYE: CPT

## 2025-01-25 PROCEDURE — 84443 ASSAY THYROID STIM HORMONE: CPT | Performed by: NURSE PRACTITIONER

## 2025-01-25 PROCEDURE — 70486 CT MAXILLOFACIAL W/O DYE: CPT | Performed by: RADIOLOGY

## 2025-01-25 PROCEDURE — 70450 CT HEAD/BRAIN W/O DYE: CPT | Performed by: RADIOLOGY

## 2025-01-25 PROCEDURE — 86901 BLOOD TYPING SEROLOGIC RH(D): CPT | Performed by: EMERGENCY MEDICINE

## 2025-01-25 PROCEDURE — 74177 CT ABD & PELVIS W/CONTRAST: CPT | Performed by: RADIOLOGY

## 2025-01-25 PROCEDURE — 99291 CRITICAL CARE FIRST HOUR: CPT | Performed by: EMERGENCY MEDICINE

## 2025-01-25 PROCEDURE — 36415 COLL VENOUS BLD VENIPUNCTURE: CPT | Performed by: EMERGENCY MEDICINE

## 2025-01-25 PROCEDURE — 72131 CT LUMBAR SPINE W/O DYE: CPT

## 2025-01-25 PROCEDURE — 72129 CT CHEST SPINE W/DYE: CPT | Performed by: RADIOLOGY

## 2025-01-25 PROCEDURE — G0390 TRAUMA RESPONS W/HOSP CRITI: HCPCS

## 2025-01-25 PROCEDURE — 96361 HYDRATE IV INFUSION ADD-ON: CPT

## 2025-01-25 PROCEDURE — 70450 CT HEAD/BRAIN W/O DYE: CPT

## 2025-01-25 PROCEDURE — 71260 CT THORAX DX C+: CPT | Performed by: RADIOLOGY

## 2025-01-25 PROCEDURE — 76377 3D RENDER W/INTRP POSTPROCES: CPT | Performed by: RADIOLOGY

## 2025-01-25 PROCEDURE — 71045 X-RAY EXAM CHEST 1 VIEW: CPT | Performed by: RADIOLOGY

## 2025-01-25 PROCEDURE — 71260 CT THORAX DX C+: CPT

## 2025-01-25 PROCEDURE — 96374 THER/PROPH/DIAG INJ IV PUSH: CPT | Mod: 59

## 2025-01-25 PROCEDURE — 70486 CT MAXILLOFACIAL W/O DYE: CPT

## 2025-01-25 RX ORDER — FENTANYL CITRATE 50 UG/ML
INJECTION, SOLUTION INTRAMUSCULAR; INTRAVENOUS
Status: DISPENSED
Start: 2025-01-25 | End: 2025-01-26

## 2025-01-25 RX ORDER — FENTANYL CITRATE 50 UG/ML
INJECTION, SOLUTION INTRAMUSCULAR; INTRAVENOUS CODE/TRAUMA/SEDATION MEDICATION
Status: COMPLETED | OUTPATIENT
Start: 2025-01-25 | End: 2025-01-25

## 2025-01-25 RX ADMIN — IOHEXOL 100 ML: 350 INJECTION, SOLUTION INTRAVENOUS at 23:34

## 2025-01-25 RX ADMIN — SODIUM CHLORIDE, POTASSIUM CHLORIDE, SODIUM LACTATE AND CALCIUM CHLORIDE 1000 ML: 600; 310; 30; 20 INJECTION, SOLUTION INTRAVENOUS at 23:13

## 2025-01-25 RX ADMIN — FENTANYL CITRATE 50 MCG: 50 INJECTION, SOLUTION INTRAMUSCULAR; INTRAVENOUS at 23:13

## 2025-01-25 ASSESSMENT — LIFESTYLE VARIABLES
HAVE YOU EVER FELT YOU SHOULD CUT DOWN ON YOUR DRINKING: NO
EVER FELT BAD OR GUILTY ABOUT YOUR DRINKING: NO
EVER HAD A DRINK FIRST THING IN THE MORNING TO STEADY YOUR NERVES TO GET RID OF A HANGOVER: NO
TOTAL SCORE: 0
HAVE PEOPLE ANNOYED YOU BY CRITICIZING YOUR DRINKING: NO

## 2025-01-25 ASSESSMENT — PAIN SCALES - GENERAL
PAINLEVEL_OUTOF10: 7
PAINLEVEL_OUTOF10: 0 - NO PAIN
PAINLEVEL_OUTOF10: 5 - MODERATE PAIN

## 2025-01-25 ASSESSMENT — PAIN - FUNCTIONAL ASSESSMENT
PAIN_FUNCTIONAL_ASSESSMENT: 0-10
PAIN_FUNCTIONAL_ASSESSMENT: 0-10

## 2025-01-25 ASSESSMENT — COLUMBIA-SUICIDE SEVERITY RATING SCALE - C-SSRS
1. IN THE PAST MONTH, HAVE YOU WISHED YOU WERE DEAD OR WISHED YOU COULD GO TO SLEEP AND NOT WAKE UP?: NO
2. HAVE YOU ACTUALLY HAD ANY THOUGHTS OF KILLING YOURSELF?: NO
6. HAVE YOU EVER DONE ANYTHING, STARTED TO DO ANYTHING, OR PREPARED TO DO ANYTHING TO END YOUR LIFE?: NO

## 2025-01-26 ENCOUNTER — APPOINTMENT (OUTPATIENT)
Dept: RADIOLOGY | Facility: HOSPITAL | Age: 65
End: 2025-01-26
Payer: MEDICAID

## 2025-01-26 VITALS
HEIGHT: 67 IN | BODY MASS INDEX: 23.39 KG/M2 | RESPIRATION RATE: 16 BRPM | OXYGEN SATURATION: 96 % | TEMPERATURE: 99.4 F | SYSTOLIC BLOOD PRESSURE: 122 MMHG | DIASTOLIC BLOOD PRESSURE: 84 MMHG | HEART RATE: 105 BPM | WEIGHT: 149 LBS

## 2025-01-26 PROBLEM — S42.115B: Status: ACTIVE | Noted: 2025-01-26

## 2025-01-26 LAB
ABO GROUP (TYPE) IN BLOOD: NORMAL
ABO GROUP (TYPE) IN BLOOD: NORMAL
ALBUMIN SERPL BCP-MCNC: 5.2 G/DL (ref 3.4–5)
ALBUMIN SERPL BCP-MCNC: 5.2 G/DL (ref 3.4–5)
ALP SERPL-CCNC: 63 U/L (ref 33–136)
ALP SERPL-CCNC: 63 U/L (ref 33–136)
ALT SERPL W P-5'-P-CCNC: 7 U/L (ref 10–52)
ALT SERPL W P-5'-P-CCNC: 7 U/L (ref 10–52)
ANION GAP SERPL CALC-SCNC: 15 MMOL/L (ref 10–20)
ANION GAP SERPL CALC-SCNC: 15 MMOL/L (ref 10–20)
ANTIBODY SCREEN: NORMAL
ANTIBODY SCREEN: NORMAL
AST SERPL W P-5'-P-CCNC: 18 U/L (ref 9–39)
AST SERPL W P-5'-P-CCNC: 18 U/L (ref 9–39)
BASOPHILS # BLD AUTO: 0.02 X10*3/UL (ref 0–0.1)
BASOPHILS # BLD AUTO: 0.02 X10*3/UL (ref 0–0.1)
BASOPHILS NFR BLD AUTO: 0.4 %
BASOPHILS NFR BLD AUTO: 0.4 %
BILIRUB SERPL-MCNC: 1.8 MG/DL (ref 0–1.2)
BILIRUB SERPL-MCNC: 1.8 MG/DL (ref 0–1.2)
BUN SERPL-MCNC: 6 MG/DL (ref 6–23)
BUN SERPL-MCNC: 6 MG/DL (ref 6–23)
CALCIUM SERPL-MCNC: 10.3 MG/DL (ref 8.6–10.6)
CALCIUM SERPL-MCNC: 10.3 MG/DL (ref 8.6–10.6)
CHLORIDE SERPL-SCNC: 99 MMOL/L (ref 98–107)
CHLORIDE SERPL-SCNC: 99 MMOL/L (ref 98–107)
CO2 SERPL-SCNC: 30 MMOL/L (ref 21–32)
CO2 SERPL-SCNC: 30 MMOL/L (ref 21–32)
CREAT SERPL-MCNC: 1.16 MG/DL (ref 0.5–1.3)
CREAT SERPL-MCNC: 1.16 MG/DL (ref 0.5–1.3)
EGFRCR SERPLBLD CKD-EPI 2021: 71 ML/MIN/1.73M*2
EGFRCR SERPLBLD CKD-EPI 2021: 71 ML/MIN/1.73M*2
EOSINOPHIL # BLD AUTO: 0.04 X10*3/UL (ref 0–0.7)
EOSINOPHIL # BLD AUTO: 0.04 X10*3/UL (ref 0–0.7)
EOSINOPHIL NFR BLD AUTO: 0.9 %
EOSINOPHIL NFR BLD AUTO: 0.9 %
ERYTHROCYTE [DISTWIDTH] IN BLOOD BY AUTOMATED COUNT: 13.7 % (ref 11.5–14.5)
ERYTHROCYTE [DISTWIDTH] IN BLOOD BY AUTOMATED COUNT: 13.7 % (ref 11.5–14.5)
ETHANOL SERPL-MCNC: <10 MG/DL
ETHANOL SERPL-MCNC: <10 MG/DL
GLUCOSE SERPL-MCNC: 103 MG/DL (ref 74–99)
GLUCOSE SERPL-MCNC: 103 MG/DL (ref 74–99)
HCT VFR BLD AUTO: 46.5 % (ref 41–52)
HCT VFR BLD AUTO: 46.5 % (ref 41–52)
HGB BLD-MCNC: 15.6 G/DL (ref 13.5–17.5)
HGB BLD-MCNC: 15.6 G/DL (ref 13.5–17.5)
IMM GRANULOCYTES # BLD AUTO: 0.01 X10*3/UL (ref 0–0.7)
IMM GRANULOCYTES # BLD AUTO: 0.01 X10*3/UL (ref 0–0.7)
IMM GRANULOCYTES NFR BLD AUTO: 0.2 % (ref 0–0.9)
IMM GRANULOCYTES NFR BLD AUTO: 0.2 % (ref 0–0.9)
INR PPP: 1 (ref 0.9–1.1)
INR PPP: 1 (ref 0.9–1.1)
LACTATE SERPL-SCNC: 1.1 MMOL/L (ref 0.4–2)
LACTATE SERPL-SCNC: 1.1 MMOL/L (ref 0.4–2)
LYMPHOCYTES # BLD AUTO: 1.01 X10*3/UL (ref 1.2–4.8)
LYMPHOCYTES # BLD AUTO: 1.01 X10*3/UL (ref 1.2–4.8)
LYMPHOCYTES NFR BLD AUTO: 22.1 %
LYMPHOCYTES NFR BLD AUTO: 22.1 %
MCH RBC QN AUTO: 29.5 PG (ref 26–34)
MCH RBC QN AUTO: 29.5 PG (ref 26–34)
MCHC RBC AUTO-ENTMCNC: 33.5 G/DL (ref 32–36)
MCHC RBC AUTO-ENTMCNC: 33.5 G/DL (ref 32–36)
MCV RBC AUTO: 88 FL (ref 80–100)
MCV RBC AUTO: 88 FL (ref 80–100)
MONOCYTES # BLD AUTO: 0.53 X10*3/UL (ref 0.1–1)
MONOCYTES # BLD AUTO: 0.53 X10*3/UL (ref 0.1–1)
MONOCYTES NFR BLD AUTO: 11.6 %
MONOCYTES NFR BLD AUTO: 11.6 %
NEUTROPHILS # BLD AUTO: 2.95 X10*3/UL (ref 1.2–7.7)
NEUTROPHILS # BLD AUTO: 2.95 X10*3/UL (ref 1.2–7.7)
NEUTROPHILS NFR BLD AUTO: 64.8 %
NEUTROPHILS NFR BLD AUTO: 64.8 %
NRBC BLD-RTO: 0 /100 WBCS (ref 0–0)
NRBC BLD-RTO: 0 /100 WBCS (ref 0–0)
PLATELET # BLD AUTO: 363 X10*3/UL (ref 150–450)
PLATELET # BLD AUTO: 363 X10*3/UL (ref 150–450)
POTASSIUM SERPL-SCNC: 3.5 MMOL/L (ref 3.5–5.3)
POTASSIUM SERPL-SCNC: 3.5 MMOL/L (ref 3.5–5.3)
PROT SERPL-MCNC: 8.3 G/DL (ref 6.4–8.2)
PROT SERPL-MCNC: 8.3 G/DL (ref 6.4–8.2)
PROTHROMBIN TIME: 11.5 SECONDS (ref 9.8–12.8)
PROTHROMBIN TIME: 11.5 SECONDS (ref 9.8–12.8)
RBC # BLD AUTO: 5.29 X10*6/UL (ref 4.5–5.9)
RBC # BLD AUTO: 5.29 X10*6/UL (ref 4.5–5.9)
RH FACTOR (ANTIGEN D): NORMAL
RH FACTOR (ANTIGEN D): NORMAL
SODIUM SERPL-SCNC: 140 MMOL/L (ref 136–145)
SODIUM SERPL-SCNC: 140 MMOL/L (ref 136–145)
WBC # BLD AUTO: 4.6 X10*3/UL (ref 4.4–11.3)
WBC # BLD AUTO: 4.6 X10*3/UL (ref 4.4–11.3)

## 2025-01-26 PROCEDURE — G0378 HOSPITAL OBSERVATION PER HR: HCPCS

## 2025-01-26 PROCEDURE — 2500000001 HC RX 250 WO HCPCS SELF ADMINISTERED DRUGS (ALT 637 FOR MEDICARE OP): Performed by: NURSE PRACTITIONER

## 2025-01-26 PROCEDURE — 99232 SBSQ HOSP IP/OBS MODERATE 35: CPT | Performed by: STUDENT IN AN ORGANIZED HEALTH CARE EDUCATION/TRAINING PROGRAM

## 2025-01-26 PROCEDURE — 73610 X-RAY EXAM OF ANKLE: CPT | Mod: LT

## 2025-01-26 PROCEDURE — 96372 THER/PROPH/DIAG INJ SC/IM: CPT | Performed by: NURSE PRACTITIONER

## 2025-01-26 PROCEDURE — 2500000004 HC RX 250 GENERAL PHARMACY W/ HCPCS (ALT 636 FOR OP/ED): Performed by: NURSE PRACTITIONER

## 2025-01-26 PROCEDURE — 73030 X-RAY EXAM OF SHOULDER: CPT | Mod: RT

## 2025-01-26 PROCEDURE — 73590 X-RAY EXAM OF LOWER LEG: CPT | Mod: LEFT SIDE | Performed by: STUDENT IN AN ORGANIZED HEALTH CARE EDUCATION/TRAINING PROGRAM

## 2025-01-26 PROCEDURE — 73610 X-RAY EXAM OF ANKLE: CPT | Mod: LEFT SIDE | Performed by: STUDENT IN AN ORGANIZED HEALTH CARE EDUCATION/TRAINING PROGRAM

## 2025-01-26 PROCEDURE — 96375 TX/PRO/DX INJ NEW DRUG ADDON: CPT

## 2025-01-26 PROCEDURE — 73564 X-RAY EXAM KNEE 4 OR MORE: CPT | Mod: LT

## 2025-01-26 PROCEDURE — 73564 X-RAY EXAM KNEE 4 OR MORE: CPT | Mod: RT

## 2025-01-26 PROCEDURE — 73030 X-RAY EXAM OF SHOULDER: CPT | Mod: RIGHT SIDE | Performed by: STUDENT IN AN ORGANIZED HEALTH CARE EDUCATION/TRAINING PROGRAM

## 2025-01-26 PROCEDURE — 73552 X-RAY EXAM OF FEMUR 2/>: CPT | Mod: LT

## 2025-01-26 PROCEDURE — 73010 X-RAY EXAM OF SHOULDER BLADE: CPT | Mod: LT

## 2025-01-26 PROCEDURE — 73552 X-RAY EXAM OF FEMUR 2/>: CPT | Mod: RIGHT SIDE | Performed by: STUDENT IN AN ORGANIZED HEALTH CARE EDUCATION/TRAINING PROGRAM

## 2025-01-26 PROCEDURE — 73564 X-RAY EXAM KNEE 4 OR MORE: CPT | Mod: LEFT SIDE | Performed by: STUDENT IN AN ORGANIZED HEALTH CARE EDUCATION/TRAINING PROGRAM

## 2025-01-26 PROCEDURE — 73564 X-RAY EXAM KNEE 4 OR MORE: CPT | Mod: RIGHT SIDE | Performed by: RADIOLOGY

## 2025-01-26 PROCEDURE — 73030 X-RAY EXAM OF SHOULDER: CPT | Mod: LEFT SIDE | Performed by: RADIOLOGY

## 2025-01-26 PROCEDURE — 73552 X-RAY EXAM OF FEMUR 2/>: CPT | Mod: LEFT SIDE | Performed by: STUDENT IN AN ORGANIZED HEALTH CARE EDUCATION/TRAINING PROGRAM

## 2025-01-26 PROCEDURE — 99221 1ST HOSP IP/OBS SF/LOW 40: CPT

## 2025-01-26 PROCEDURE — 73552 X-RAY EXAM OF FEMUR 2/>: CPT | Mod: RT

## 2025-01-26 PROCEDURE — 73030 X-RAY EXAM OF SHOULDER: CPT | Mod: LT

## 2025-01-26 PROCEDURE — 2500000004 HC RX 250 GENERAL PHARMACY W/ HCPCS (ALT 636 FOR OP/ED)

## 2025-01-26 PROCEDURE — 73590 X-RAY EXAM OF LOWER LEG: CPT | Mod: LT

## 2025-01-26 PROCEDURE — 73010 X-RAY EXAM OF SHOULDER BLADE: CPT | Mod: LEFT SIDE | Performed by: RADIOLOGY

## 2025-01-26 RX ORDER — TRAMADOL HYDROCHLORIDE 50 MG/1
50 TABLET ORAL EVERY 6 HOURS PRN
COMMUNITY

## 2025-01-26 RX ORDER — ARMODAFINIL 150 MG/1
150 TABLET ORAL DAILY
COMMUNITY

## 2025-01-26 RX ORDER — METHOCARBAMOL 500 MG/1
500 TABLET, FILM COATED ORAL EVERY 6 HOURS
Status: DISCONTINUED | OUTPATIENT
Start: 2025-01-26 | End: 2025-01-27 | Stop reason: HOSPADM

## 2025-01-26 RX ORDER — OXYCODONE HYDROCHLORIDE 5 MG/1
5 TABLET ORAL EVERY 4 HOURS PRN
Status: DISCONTINUED | OUTPATIENT
Start: 2025-01-26 | End: 2025-01-27 | Stop reason: HOSPADM

## 2025-01-26 RX ORDER — HYDROMORPHONE HYDROCHLORIDE 1 MG/ML
0.5 INJECTION, SOLUTION INTRAMUSCULAR; INTRAVENOUS; SUBCUTANEOUS ONCE
Status: COMPLETED | OUTPATIENT
Start: 2025-01-26 | End: 2025-01-26

## 2025-01-26 RX ORDER — ACETAMINOPHEN 325 MG/1
975 TABLET ORAL EVERY 6 HOURS SCHEDULED
Status: DISCONTINUED | OUTPATIENT
Start: 2025-01-26 | End: 2025-01-27 | Stop reason: HOSPADM

## 2025-01-26 RX ORDER — ENOXAPARIN SODIUM 100 MG/ML
30 INJECTION SUBCUTANEOUS EVERY 12 HOURS
Status: DISCONTINUED | OUTPATIENT
Start: 2025-01-26 | End: 2025-01-27 | Stop reason: HOSPADM

## 2025-01-26 RX ORDER — DEXTROMETHORPHAN HYDROBROMIDE, GUAIFENESIN 5; 100 MG/5ML; MG/5ML
650 LIQUID ORAL EVERY 8 HOURS PRN
COMMUNITY
End: 2025-01-27 | Stop reason: HOSPADM

## 2025-01-26 RX ORDER — ZINC GLUCONATE 13.3 MG
200 LOZENGE ORAL 2 TIMES DAILY PRN
COMMUNITY

## 2025-01-26 RX ORDER — POLYETHYLENE GLYCOL 3350 17 G/17G
17 POWDER, FOR SOLUTION ORAL DAILY
Status: DISCONTINUED | OUTPATIENT
Start: 2025-01-26 | End: 2025-01-27 | Stop reason: HOSPADM

## 2025-01-26 RX ADMIN — METHOCARBAMOL 500 MG: 500 TABLET ORAL at 21:55

## 2025-01-26 RX ADMIN — HYDROMORPHONE HYDROCHLORIDE 0.5 MG: 1 INJECTION, SOLUTION INTRAMUSCULAR; INTRAVENOUS; SUBCUTANEOUS at 05:46

## 2025-01-26 RX ADMIN — ENOXAPARIN SODIUM 30 MG: 100 INJECTION SUBCUTANEOUS at 06:23

## 2025-01-26 RX ADMIN — ACETAMINOPHEN 975 MG: 325 TABLET ORAL at 21:55

## 2025-01-26 RX ADMIN — ENOXAPARIN SODIUM 30 MG: 100 INJECTION SUBCUTANEOUS at 18:59

## 2025-01-26 RX ADMIN — OXYCODONE HYDROCHLORIDE 5 MG: 5 TABLET ORAL at 16:54

## 2025-01-26 RX ADMIN — ACETAMINOPHEN 975 MG: 325 TABLET ORAL at 14:14

## 2025-01-26 RX ADMIN — ACETAMINOPHEN 975 MG: 325 TABLET ORAL at 06:23

## 2025-01-26 RX ADMIN — OXYCODONE HYDROCHLORIDE 5 MG: 5 TABLET ORAL at 10:52

## 2025-01-26 SDOH — SOCIAL STABILITY: SOCIAL INSECURITY: WITHIN THE LAST YEAR, HAVE YOU BEEN AFRAID OF YOUR PARTNER OR EX-PARTNER?: NO

## 2025-01-26 SDOH — SOCIAL STABILITY: SOCIAL INSECURITY: WITHIN THE LAST YEAR, HAVE YOU BEEN HUMILIATED OR EMOTIONALLY ABUSED IN OTHER WAYS BY YOUR PARTNER OR EX-PARTNER?: NO

## 2025-01-26 SDOH — ECONOMIC STABILITY: INCOME INSECURITY: IN THE PAST 12 MONTHS HAS THE ELECTRIC, GAS, OIL, OR WATER COMPANY THREATENED TO SHUT OFF SERVICES IN YOUR HOME?: NO

## 2025-01-26 SDOH — SOCIAL STABILITY: SOCIAL INSECURITY: ARE YOU OR HAVE YOU BEEN THREATENED OR ABUSED PHYSICALLY, EMOTIONALLY, OR SEXUALLY BY ANYONE?: NO

## 2025-01-26 SDOH — SOCIAL STABILITY: SOCIAL INSECURITY: DO YOU FEEL UNSAFE GOING BACK TO THE PLACE WHERE YOU ARE LIVING?: NO

## 2025-01-26 SDOH — SOCIAL STABILITY: SOCIAL INSECURITY: ARE THERE ANY APPARENT SIGNS OF INJURIES/BEHAVIORS THAT COULD BE RELATED TO ABUSE/NEGLECT?: NO

## 2025-01-26 SDOH — SOCIAL STABILITY: SOCIAL INSECURITY
WITHIN THE LAST YEAR, HAVE YOU BEEN KICKED, HIT, SLAPPED, OR OTHERWISE PHYSICALLY HURT BY YOUR PARTNER OR EX-PARTNER?: NO

## 2025-01-26 SDOH — SOCIAL STABILITY: SOCIAL INSECURITY: HAVE YOU HAD THOUGHTS OF HARMING ANYONE ELSE?: NO

## 2025-01-26 SDOH — SOCIAL STABILITY: SOCIAL INSECURITY
WITHIN THE LAST YEAR, HAVE YOU BEEN RAPED OR FORCED TO HAVE ANY KIND OF SEXUAL ACTIVITY BY YOUR PARTNER OR EX-PARTNER?: NO

## 2025-01-26 SDOH — SOCIAL STABILITY: SOCIAL INSECURITY: ABUSE: ADULT

## 2025-01-26 SDOH — SOCIAL STABILITY: SOCIAL INSECURITY: DO YOU FEEL ANYONE HAS EXPLOITED OR TAKEN ADVANTAGE OF YOU FINANCIALLY OR OF YOUR PERSONAL PROPERTY?: YES

## 2025-01-26 SDOH — SOCIAL STABILITY: SOCIAL INSECURITY: HAS ANYONE EVER THREATENED TO HURT YOUR FAMILY OR YOUR PETS?: NO

## 2025-01-26 SDOH — SOCIAL STABILITY: SOCIAL INSECURITY: DOES ANYONE TRY TO KEEP YOU FROM HAVING/CONTACTING OTHER FRIENDS OR DOING THINGS OUTSIDE YOUR HOME?: NO

## 2025-01-26 SDOH — SOCIAL STABILITY: SOCIAL INSECURITY: HAVE YOU HAD ANY THOUGHTS OF HARMING ANYONE ELSE?: NO

## 2025-01-26 SDOH — SOCIAL STABILITY: SOCIAL INSECURITY: WERE YOU ABLE TO COMPLETE ALL THE BEHAVIORAL HEALTH SCREENINGS?: YES

## 2025-01-26 ASSESSMENT — PAIN SCALES - GENERAL
PAINLEVEL_OUTOF10: 5 - MODERATE PAIN
PAINLEVEL_OUTOF10: 6
PAINLEVEL_OUTOF10: 4
PAINLEVEL_OUTOF10: 6
PAINLEVEL_OUTOF10: 5 - MODERATE PAIN
PAINLEVEL_OUTOF10: 6
PAINLEVEL_OUTOF10: 6
PAINLEVEL_OUTOF10: 7
PAINLEVEL_OUTOF10: 6

## 2025-01-26 ASSESSMENT — ACTIVITIES OF DAILY LIVING (ADL)
HEARING - LEFT EAR: FUNCTIONAL
DRESSING YOURSELF: NEEDS ASSISTANCE
WALKS IN HOME: NEEDS ASSISTANCE
BATHING: NEEDS ASSISTANCE
LACK_OF_TRANSPORTATION: NO
GROOMING: INDEPENDENT
TOILETING: INDEPENDENT
ADEQUATE_TO_COMPLETE_ADL: YES
HEARING - RIGHT EAR: FUNCTIONAL
JUDGMENT_ADEQUATE_SAFELY_COMPLETE_DAILY_ACTIVITIES: YES
PATIENT'S MEMORY ADEQUATE TO SAFELY COMPLETE DAILY ACTIVITIES?: YES
FEEDING YOURSELF: INDEPENDENT

## 2025-01-26 ASSESSMENT — ENCOUNTER SYMPTOMS
NAUSEA: 0
BACK PAIN: 1
BRUISES/BLEEDS EASILY: 0
ABDOMINAL PAIN: 0
NUMBNESS: 0
MYALGIAS: 1
WEAKNESS: 0
VOMITING: 0
SHORTNESS OF BREATH: 0
NECK PAIN: 0

## 2025-01-26 ASSESSMENT — COGNITIVE AND FUNCTIONAL STATUS - GENERAL
TURNING FROM BACK TO SIDE WHILE IN FLAT BAD: A LITTLE
CLIMB 3 TO 5 STEPS WITH RAILING: A LITTLE
MOVING TO AND FROM BED TO CHAIR: A LITTLE
DRESSING REGULAR LOWER BODY CLOTHING: A LITTLE
HELP NEEDED FOR BATHING: A LITTLE
DRESSING REGULAR UPPER BODY CLOTHING: A LITTLE
MOVING FROM LYING ON BACK TO SITTING ON SIDE OF FLAT BED WITH BEDRAILS: A LITTLE
PATIENT BASELINE BEDBOUND: NO
DAILY ACTIVITIY SCORE: 21
MOBILITY SCORE: 20

## 2025-01-26 ASSESSMENT — PAIN - FUNCTIONAL ASSESSMENT
PAIN_FUNCTIONAL_ASSESSMENT: 0-10

## 2025-01-26 ASSESSMENT — LIFESTYLE VARIABLES
HOW MANY STANDARD DRINKS CONTAINING ALCOHOL DO YOU HAVE ON A TYPICAL DAY: PATIENT DOES NOT DRINK
AUDIT-C TOTAL SCORE: 0
SKIP TO QUESTIONS 9-10: 1
HOW OFTEN DO YOU HAVE A DRINK CONTAINING ALCOHOL: NEVER
AUDIT-C TOTAL SCORE: 0
HOW OFTEN DO YOU HAVE 6 OR MORE DRINKS ON ONE OCCASION: NEVER

## 2025-01-26 ASSESSMENT — PATIENT HEALTH QUESTIONNAIRE - PHQ9
2. FEELING DOWN, DEPRESSED OR HOPELESS: NOT AT ALL
SUM OF ALL RESPONSES TO PHQ9 QUESTIONS 1 & 2: 1
1. LITTLE INTEREST OR PLEASURE IN DOING THINGS: SEVERAL DAYS

## 2025-01-26 ASSESSMENT — PAIN DESCRIPTION - LOCATION
LOCATION: NECK

## 2025-01-26 NOTE — ED PROCEDURE NOTE
Procedure  Critical Care    Performed by: German Padilla MD  Authorized by: German Padilla MD    Critical care provider statement:     Critical care time (minutes):  30    Critical care time was exclusive of:  Separately billable procedures and treating other patients    Critical care was necessary to treat or prevent imminent or life-threatening deterioration of the following conditions:  Trauma    Critical care was time spent personally by me on the following activities:  Ordering and performing treatments and interventions, ordering and review of laboratory studies, development of treatment plan with patient or surrogate, discussions with consultants, ordering and review of radiographic studies, pulse oximetry, evaluation of patient's response to treatment, re-evaluation of patient's condition, examination of patient, interpretation of cardiac output measurements and obtaining history from patient or surrogate               German Padilla MD  01/26/25 9227
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
## 2025-01-26 NOTE — PROGRESS NOTES
Pharmacy Medication History Review    Javy Kauffman is a 64 y.o. male admitted for Nondisplaced fracture of body of scapula, left shoulder, initial encounter for open fracture. Pharmacy reviewed the patient's [id number] medications and allergies for accuracy.    Medications ADDED:  All Medications Added  Medications CHANGED:  None   Medications REMOVED:   None      The list below reflects the updated PTA list.   Prior to Admission Medications   Prescriptions Last Dose Informant   acetaminophen (Tylenol 8 HOUR) 650 mg ER tablet  Self   Sig: Take 1 tablet (650 mg) by mouth every 8 hours if needed for mild pain (1 - 3). Do not crush, chew, or split.   armodafinil (Nuvigil) 150 mg tablet  Self   Sig: Take 1 tablet (150 mg) by mouth once daily.   cimetidine (Tagamet) 200 mg tablet  Self   Sig: Take 1 tablet (200 mg) by mouth 2 times a day as needed (acid indigestion).   olopatadine-mometasone 665-25 mcg/spray spray,non-aerosol  Self   Sig: Administer 2 sprays into affected nostril(s) 2 times a day.   traMADol (Ultram) 50 mg tablet  Self   Sig: Take 1 tablet (50 mg) by mouth every 6 hours if needed for severe pain (7 - 10).      Facility-Administered Medications: None          The list below reflects the updated allergy list. Please review each documented allergy for additional clarification and justification.  Allergies  Reviewed by Lidia Moctezuma on 1/26/2025   No Known Allergies         Patient accepts M2B at discharge.     Sources:   [facility]  Pharmacy dispense history  Patient interview Good historian  Chart Review  Care Everywhere  [facility] [phone] Called for recent fill history      Additional Comments:  Patient stated he takes Bentyl, there is no recent fill history available for this medication.  [facility] verified patients most recent fill history. The PTA list was updated accordingly.  Patient stated he takes Tramadol, last filled 12/6/24. 28 tabs for 7 days.      Lidia Moctezuma  Pharmacy  "Technician  01/26/25     Secure Chat preferred   If no response call [phone] or Vocera \"Med Rec\"   "
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
## 2025-01-27 ENCOUNTER — PHARMACY VISIT (OUTPATIENT)
Dept: PHARMACY | Facility: CLINIC | Age: 65
End: 2025-01-27
Payer: MEDICARE

## 2025-01-27 VITALS
SYSTOLIC BLOOD PRESSURE: 136 MMHG | DIASTOLIC BLOOD PRESSURE: 87 MMHG | TEMPERATURE: 96.6 F | HEART RATE: 116 BPM | WEIGHT: 149 LBS | BODY MASS INDEX: 23.39 KG/M2 | RESPIRATION RATE: 16 BRPM | HEIGHT: 67 IN | OXYGEN SATURATION: 94 %

## 2025-01-27 LAB — TSH SERPL-ACNC: 1.71 MIU/L (ref 0.44–3.98)

## 2025-01-27 PROCEDURE — 96372 THER/PROPH/DIAG INJ SC/IM: CPT | Performed by: NURSE PRACTITIONER

## 2025-01-27 PROCEDURE — 2500000001 HC RX 250 WO HCPCS SELF ADMINISTERED DRUGS (ALT 637 FOR MEDICARE OP): Performed by: NURSE PRACTITIONER

## 2025-01-27 PROCEDURE — 99239 HOSP IP/OBS DSCHRG MGMT >30: CPT | Performed by: STUDENT IN AN ORGANIZED HEALTH CARE EDUCATION/TRAINING PROGRAM

## 2025-01-27 PROCEDURE — RXMED WILLOW AMBULATORY MEDICATION CHARGE

## 2025-01-27 PROCEDURE — 2500000004 HC RX 250 GENERAL PHARMACY W/ HCPCS (ALT 636 FOR OP/ED): Performed by: NURSE PRACTITIONER

## 2025-01-27 PROCEDURE — 97161 PT EVAL LOW COMPLEX 20 MIN: CPT | Mod: GP

## 2025-01-27 PROCEDURE — 90791 PSYCH DIAGNOSTIC EVALUATION: CPT | Performed by: PSYCHOLOGIST

## 2025-01-27 PROCEDURE — G0378 HOSPITAL OBSERVATION PER HR: HCPCS

## 2025-01-27 RX ORDER — OXYCODONE HYDROCHLORIDE 5 MG/1
5 TABLET ORAL EVERY 6 HOURS PRN
Qty: 10 TABLET | Refills: 0 | Status: SHIPPED | OUTPATIENT
Start: 2025-01-27

## 2025-01-27 RX ORDER — ACETAMINOPHEN 325 MG/1
975 TABLET ORAL EVERY 8 HOURS PRN
Qty: 270 TABLET | Refills: 0 | Status: SHIPPED | OUTPATIENT
Start: 2025-01-27 | End: 2025-02-26

## 2025-01-27 RX ORDER — POLYETHYLENE GLYCOL 3350 17 G/17G
17 POWDER, FOR SOLUTION ORAL DAILY
Qty: 4 PACKET | Refills: 0 | Status: SHIPPED | OUTPATIENT
Start: 2025-01-28 | End: 2025-02-01

## 2025-01-27 RX ORDER — METHOCARBAMOL 500 MG/1
500 TABLET, FILM COATED ORAL EVERY 6 HOURS PRN
Qty: 10 TABLET | Refills: 0 | Status: SHIPPED | OUTPATIENT
Start: 2025-01-27

## 2025-01-27 RX ADMIN — ACETAMINOPHEN 975 MG: 325 TABLET ORAL at 08:18

## 2025-01-27 RX ADMIN — ACETAMINOPHEN 975 MG: 325 TABLET ORAL at 02:32

## 2025-01-27 RX ADMIN — METHOCARBAMOL 500 MG: 500 TABLET ORAL at 08:17

## 2025-01-27 RX ADMIN — METHOCARBAMOL 500 MG: 500 TABLET ORAL at 14:43

## 2025-01-27 RX ADMIN — ENOXAPARIN SODIUM 30 MG: 100 INJECTION SUBCUTANEOUS at 05:37

## 2025-01-27 RX ADMIN — METHOCARBAMOL 500 MG: 500 TABLET ORAL at 02:32

## 2025-01-27 RX ADMIN — OXYCODONE HYDROCHLORIDE 5 MG: 5 TABLET ORAL at 08:18

## 2025-01-27 RX ADMIN — ACETAMINOPHEN 975 MG: 325 TABLET ORAL at 14:43

## 2025-01-27 ASSESSMENT — COGNITIVE AND FUNCTIONAL STATUS - GENERAL
DAILY ACTIVITIY SCORE: 24
MOBILITY SCORE: 24
MOBILITY SCORE: 24

## 2025-01-27 ASSESSMENT — ACTIVITIES OF DAILY LIVING (ADL): ADL_ASSISTANCE: INDEPENDENT

## 2025-01-27 ASSESSMENT — PAIN SCALES - GENERAL
PAINLEVEL_OUTOF10: 4
PAINLEVEL_OUTOF10: 0 - NO PAIN
PAINLEVEL_OUTOF10: 3

## 2025-01-27 ASSESSMENT — PAIN - FUNCTIONAL ASSESSMENT
PAIN_FUNCTIONAL_ASSESSMENT: 0-10
PAIN_FUNCTIONAL_ASSESSMENT: 0-10
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
## 2025-01-27 NOTE — PROGRESS NOTES
AVS provided to patient and all questions answered. Medications delivered via Meds to Beds. Patient stable with all belongings upon discharge.
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
## 2025-01-27 NOTE — CARE PLAN
Transitional Care Coordinator Note: Patient discussed with medical team, per medical team patient is medically ready. Discharge dispo: [facility]. ADOD 1/27  Jose Armando Fernández RN  Transitional Care Coordinator

## 2025-01-27 NOTE — CARE PLAN
Problem: Fall/Injury  Goal: Not fall by end of shift  Outcome: Met     Problem: Fall/Injury  Goal: Be free from injury by end of the shift  Outcome: Met   The patient's goals for the shift include      The clinical goals for the shift include safety, discharge    Report given to Mary, mich RN. AVS instructions provided. Awaiting meds to beds delivery and parents arrival to transport him home. Resting comfortably at present.
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
## 2025-01-27 NOTE — CARE PLAN
Problem: Pain - Adult  Goal: Verbalizes/displays adequate comfort level or baseline comfort level  Outcome: Progressing     Problem: Fall/Injury  Goal: Not fall by end of shift  Outcome: Progressing  Goal: Be free from injury by end of the shift  Outcome: Progressing  Goal: Verbalize understanding of personal risk factors for fall in the hospital  Outcome: Progressing  Goal: Use assistive devices by end of the shift  Outcome: Progressing  Goal: Pace activities to prevent fatigue by end of the shift  Outcome: Progressing    The clinical goals for the shift include Patient's pain controlled to tolerable level. Patient compliant with DVT prophylaxis. Patient remains safe and free from falls.

## 2025-02-10 ENCOUNTER — TELEPHONE (OUTPATIENT)
Dept: PRIMARY CARE | Facility: CLINIC | Age: 65
End: 2025-02-10
Payer: MEDICAID
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
## 2025-02-27 ENCOUNTER — APPOINTMENT (OUTPATIENT)
Dept: ORTHOPEDIC SURGERY | Facility: CLINIC | Age: 65
End: 2025-02-27

## 2025-03-03 ENCOUNTER — APPOINTMENT (OUTPATIENT)
Dept: ORTHOPEDIC SURGERY | Facility: HOSPITAL | Age: 65
End: 2025-03-03
Payer: MEDICAID

## 2025-03-04 ENCOUNTER — OFFICE VISIT (OUTPATIENT)
Dept: ORTHOPEDIC SURGERY | Facility: HOSPITAL | Age: 65
End: 2025-03-04
Payer: MEDICAID

## 2025-03-04 ENCOUNTER — HOSPITAL ENCOUNTER (OUTPATIENT)
Dept: RADIOLOGY | Facility: HOSPITAL | Age: 65
Discharge: HOME | End: 2025-03-04
Payer: MEDICAID

## 2025-03-04 DIAGNOSIS — S42.115B: ICD-10-CM

## 2025-03-04 DIAGNOSIS — S42.115B: Primary | ICD-10-CM

## 2025-03-04 PROCEDURE — 73030 X-RAY EXAM OF SHOULDER: CPT | Mod: LT

## 2025-03-04 PROCEDURE — 99214 OFFICE O/P EST MOD 30 MIN: CPT
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
## 2025-06-03 ENCOUNTER — APPOINTMENT (OUTPATIENT)
Dept: ORTHOPEDIC SURGERY | Facility: HOSPITAL | Age: 65
End: 2025-06-03
Payer: COMMERCIAL

## 2025-09-03 ENCOUNTER — APPOINTMENT (OUTPATIENT)
Dept: RADIOLOGY | Facility: HOSPITAL | Age: 65
End: 2025-09-03
Payer: MEDICAID

## 2025-09-03 ENCOUNTER — HOSPITAL ENCOUNTER (EMERGENCY)
Facility: HOSPITAL | Age: 65
Discharge: HOME | End: 2025-09-03
Attending: EMERGENCY MEDICINE
Payer: MEDICAID

## 2025-09-03 ENCOUNTER — CLINICAL SUPPORT (OUTPATIENT)
Dept: EMERGENCY MEDICINE | Facility: HOSPITAL | Age: 65
End: 2025-09-03
Payer: MEDICAID

## 2025-09-03 VITALS
OXYGEN SATURATION: 99 % | DIASTOLIC BLOOD PRESSURE: 92 MMHG | SYSTOLIC BLOOD PRESSURE: 139 MMHG | RESPIRATION RATE: 17 BRPM | WEIGHT: 145 LBS | HEART RATE: 86 BPM | HEIGHT: 67 IN | TEMPERATURE: 97.2 F | BODY MASS INDEX: 22.76 KG/M2

## 2025-09-03 DIAGNOSIS — S06.5XAA SUBDURAL HEMATOMA (MULTI): Primary | ICD-10-CM

## 2025-09-03 DIAGNOSIS — F41.9 ANXIETY: ICD-10-CM

## 2025-09-03 LAB
ALBUMIN SERPL BCP-MCNC: 4.7 G/DL (ref 3.4–5)
ALP SERPL-CCNC: 59 U/L (ref 33–136)
ALT SERPL W P-5'-P-CCNC: 4 U/L (ref 10–52)
ANION GAP SERPL CALC-SCNC: 13 MMOL/L (ref 10–20)
APAP SERPL-MCNC: <10 UG/ML (ref ?–30)
AST SERPL W P-5'-P-CCNC: 12 U/L (ref 9–39)
ATRIAL RATE: 100 BPM
BASOPHILS # BLD AUTO: 0.02 X10*3/UL (ref 0–0.1)
BASOPHILS NFR BLD AUTO: 0.7 %
BILIRUB SERPL-MCNC: 3.3 MG/DL (ref 0–1.2)
BUN SERPL-MCNC: 7 MG/DL (ref 6–23)
CALCIUM SERPL-MCNC: 9.8 MG/DL (ref 8.6–10.6)
CHLORIDE SERPL-SCNC: 95 MMOL/L (ref 98–107)
CO2 SERPL-SCNC: 29 MMOL/L (ref 21–32)
CREAT SERPL-MCNC: 1.03 MG/DL (ref 0.5–1.3)
EGFRCR SERPLBLD CKD-EPI 2021: 82 ML/MIN/1.73M*2
EOSINOPHIL # BLD AUTO: 0.07 X10*3/UL (ref 0–0.7)
EOSINOPHIL NFR BLD AUTO: 2.3 %
ERYTHROCYTE [DISTWIDTH] IN BLOOD BY AUTOMATED COUNT: 12.2 % (ref 11.5–14.5)
ETHANOL SERPL-MCNC: <10 MG/DL
GLUCOSE SERPL-MCNC: 94 MG/DL (ref 74–99)
HCT VFR BLD AUTO: 41.4 % (ref 41–52)
HGB BLD-MCNC: 14.4 G/DL (ref 13.5–17.5)
IMM GRANULOCYTES # BLD AUTO: 0 X10*3/UL (ref 0–0.7)
IMM GRANULOCYTES NFR BLD AUTO: 0 % (ref 0–0.9)
LYMPHOCYTES # BLD AUTO: 1.06 X10*3/UL (ref 1.2–4.8)
LYMPHOCYTES NFR BLD AUTO: 35.6 %
MCH RBC QN AUTO: 29.3 PG (ref 26–34)
MCHC RBC AUTO-ENTMCNC: 34.8 G/DL (ref 32–36)
MCV RBC AUTO: 84 FL (ref 80–100)
MONOCYTES # BLD AUTO: 0.48 X10*3/UL (ref 0.1–1)
MONOCYTES NFR BLD AUTO: 16.1 %
NEUTROPHILS # BLD AUTO: 1.35 X10*3/UL (ref 1.2–7.7)
NEUTROPHILS NFR BLD AUTO: 45.3 %
NRBC BLD-RTO: 0 /100 WBCS (ref 0–0)
P AXIS: 86 DEGREES
P OFFSET: 190 MS
P ONSET: 137 MS
PLATELET # BLD AUTO: 328 X10*3/UL (ref 150–450)
POTASSIUM SERPL-SCNC: 3.6 MMOL/L (ref 3.5–5.3)
PR INTERVAL: 168 MS
PROT SERPL-MCNC: 7.2 G/DL (ref 6.4–8.2)
Q ONSET: 221 MS
QRS COUNT: 16 BEATS
QRS DURATION: 86 MS
QT INTERVAL: 354 MS
QTC CALCULATION(BAZETT): 456 MS
QTC FREDERICIA: 420 MS
R AXIS: 73 DEGREES
RBC # BLD AUTO: 4.92 X10*6/UL (ref 4.5–5.9)
SALICYLATES SERPL-MCNC: <3 MG/DL (ref ?–20)
SODIUM SERPL-SCNC: 133 MMOL/L (ref 136–145)
T AXIS: 57 DEGREES
T OFFSET: 398 MS
TSH SERPL-ACNC: 1.01 MIU/L (ref 0.44–3.98)
VENTRICULAR RATE: 100 BPM
WBC # BLD AUTO: 3 X10*3/UL (ref 4.4–11.3)

## 2025-09-03 PROCEDURE — 70450 CT HEAD/BRAIN W/O DYE: CPT | Performed by: RADIOLOGY

## 2025-09-03 PROCEDURE — 70450 CT HEAD/BRAIN W/O DYE: CPT

## 2025-09-03 PROCEDURE — 80179 DRUG ASSAY SALICYLATE: CPT | Performed by: EMERGENCY MEDICINE

## 2025-09-03 PROCEDURE — 99285 EMERGENCY DEPT VISIT HI MDM: CPT | Mod: 25 | Performed by: EMERGENCY MEDICINE

## 2025-09-03 PROCEDURE — 80053 COMPREHEN METABOLIC PANEL: CPT

## 2025-09-03 PROCEDURE — 99222 1ST HOSP IP/OBS MODERATE 55: CPT | Performed by: NEUROLOGICAL SURGERY

## 2025-09-03 PROCEDURE — 70450 CT HEAD/BRAIN W/O DYE: CPT | Performed by: STUDENT IN AN ORGANIZED HEALTH CARE EDUCATION/TRAINING PROGRAM

## 2025-09-03 PROCEDURE — 36415 COLL VENOUS BLD VENIPUNCTURE: CPT

## 2025-09-03 PROCEDURE — 93005 ELECTROCARDIOGRAM TRACING: CPT

## 2025-09-03 PROCEDURE — 85025 COMPLETE CBC W/AUTO DIFF WBC: CPT

## 2025-09-03 PROCEDURE — 2500000001 HC RX 250 WO HCPCS SELF ADMINISTERED DRUGS (ALT 637 FOR MEDICARE OP): Mod: SE

## 2025-09-03 PROCEDURE — 84443 ASSAY THYROID STIM HORMONE: CPT | Performed by: EMERGENCY MEDICINE

## 2025-09-03 RX ORDER — HYDROXYZINE HYDROCHLORIDE 25 MG/1
25 TABLET, FILM COATED ORAL EVERY 6 HOURS
Qty: 20 TABLET | Refills: 0 | Status: SHIPPED | OUTPATIENT
Start: 2025-09-03 | End: 2025-09-08

## 2025-09-03 RX ORDER — LORAZEPAM 0.5 MG/1
0.5 TABLET ORAL ONCE
Status: COMPLETED | OUTPATIENT
Start: 2025-09-03 | End: 2025-09-03

## 2025-09-03 RX ADMIN — LORAZEPAM 0.5 MG: 0.5 TABLET ORAL at 05:53

## 2025-09-03 SDOH — HEALTH STABILITY: MENTAL HEALTH: WISH TO BE DEAD (PAST 1 MONTH): NO

## 2025-09-03 SDOH — HEALTH STABILITY: MENTAL HEALTH: BEHAVIORAL HEALTH(WDL): EXCEPTIONS TO WDL

## 2025-09-03 SDOH — SOCIAL STABILITY: SOCIAL INSECURITY: FAMILY BEHAVIORS: APPROPRIATE FOR SITUATION;SUPPORTIVE

## 2025-09-03 SDOH — HEALTH STABILITY: MENTAL HEALTH: HALLUCINATION: UNABLE TO ASSESS

## 2025-09-03 SDOH — HEALTH STABILITY: MENTAL HEALTH
OTHER SUICIDE PRECAUTIONS INCLUDE: PATIENT PLACED IN AN EASILY OBSERVABLE ROOM WITH DOOR/CURTAIN REMAINING OPEN;PATIENT PLACED IN GOWN (SNAPS OR PAPER GOWNS PREFERRED) AND WANDED;REMAINING RISKS IDENTIFIED AND MITIGATED;PROVIDER NOTIFIED;FAMILY/VISITOR ADVISED TO MAINTAIN CONTROL OF OWN PERSONAL BELONGINGS IN ROOM

## 2025-09-03 SDOH — HEALTH STABILITY: MENTAL HEALTH: NON-SPECIFIC ACTIVE SUICIDAL THOUGHTS (PAST 1 MONTH): NO

## 2025-09-03 SDOH — HEALTH STABILITY: MENTAL HEALTH: CONTENT: UNABLE TO ASSESS

## 2025-09-03 SDOH — HEALTH STABILITY: MENTAL HEALTH: SUICIDAL BEHAVIOR (LIFETIME): NO

## 2025-09-03 SDOH — HEALTH STABILITY: MENTAL HEALTH: HAVE YOU ACTUALLY HAD ANY THOUGHTS OF KILLING YOURSELF?: NO

## 2025-09-03 SDOH — HEALTH STABILITY: MENTAL HEALTH: HAVE YOU WISHED YOU WERE DEAD OR WISHED YOU COULD GO TO SLEEP AND NOT WAKE UP?: NO

## 2025-09-03 SDOH — HEALTH STABILITY: MENTAL HEALTH: HAVE YOU EVER DONE ANYTHING, STARTED TO DO ANYTHING, OR PREPARED TO DO ANYTHING TO END YOUR LIFE?: NO

## 2025-09-03 SDOH — HEALTH STABILITY: MENTAL HEALTH: SUICIDE ASSESSMENT: ADULT (C-SSRS)

## 2025-09-03 SDOH — SOCIAL STABILITY: SOCIAL NETWORK: EMOTIONAL SUPPORT GIVEN: REASSURE

## 2025-09-03 SDOH — HEALTH STABILITY: MENTAL HEALTH: BEHAVIORS/MOOD: ANXIOUS;COOPERATIVE;RESTLESS;WANDERING;WITHDRAWN

## 2025-09-03 SDOH — HEALTH STABILITY: MENTAL HEALTH: NEEDS EXPRESSED: DENIES

## 2025-09-03 SDOH — ECONOMIC STABILITY: HOUSING INSECURITY: FEELS SAFE LIVING IN HOME: YES

## 2025-09-03 SDOH — HEALTH STABILITY: MENTAL HEALTH: ANXIETY SYMPTOMS: GENERALIZED

## 2025-09-03 SDOH — SOCIAL STABILITY: SOCIAL NETWORK: VISITOR BEHAVIORS: APPROPRIATE FOR SITUATION;ANXIOUS;COOPERATIVE

## 2025-09-03 SDOH — HEALTH STABILITY: MENTAL HEALTH: SLEEP PATTERN: DIFFICULTY FALLING ASLEEP;DISTURBED/INTERRUPTED SLEEP

## 2025-09-03 SDOH — SOCIAL STABILITY: SOCIAL NETWORK: PARENT/GUARDIAN/SIGNIFICANT OTHER INVOLVEMENT: ATTENTIVE TO PATIENT NEEDS

## 2025-09-03 SDOH — HEALTH STABILITY: MENTAL HEALTH: BEHAVIORS/MOOD: ANXIOUS

## 2025-09-03 SDOH — HEALTH STABILITY: MENTAL HEALTH: DEPRESSION SYMPTOMS: FEELINGS OF HELPLESSNESS

## 2025-09-03 SDOH — HEALTH STABILITY: MENTAL HEALTH

## 2025-09-03 ASSESSMENT — LIFESTYLE VARIABLES
PRESCIPTION_ABUSE_PAST_12_MONTHS: NO
SUBSTANCE_ABUSE_PAST_12_MONTHS: NO

## 2025-09-03 ASSESSMENT — PAIN SCALES - GENERAL: PAINLEVEL_OUTOF10: 0 - NO PAIN

## 2025-09-03 ASSESSMENT — PAIN - FUNCTIONAL ASSESSMENT: PAIN_FUNCTIONAL_ASSESSMENT: 0-10

## 2025-09-04 DIAGNOSIS — S06.5XAA SDH (SUBDURAL HEMATOMA) (MULTI): Primary | ICD-10-CM
